# Patient Record
Sex: MALE | Race: WHITE | URBAN - METROPOLITAN AREA
[De-identification: names, ages, dates, MRNs, and addresses within clinical notes are randomized per-mention and may not be internally consistent; named-entity substitution may affect disease eponyms.]

---

## 2018-04-18 ENCOUNTER — OUTPATIENT (OUTPATIENT)
Dept: OUTPATIENT SERVICES | Facility: HOSPITAL | Age: 48
LOS: 1 days | Discharge: HOME | End: 2018-04-18

## 2018-04-18 DIAGNOSIS — Z79.899 OTHER LONG TERM (CURRENT) DRUG THERAPY: ICD-10-CM

## 2018-04-18 DIAGNOSIS — F20.9 SCHIZOPHRENIA, UNSPECIFIED: ICD-10-CM

## 2018-05-09 ENCOUNTER — OUTPATIENT (OUTPATIENT)
Dept: OUTPATIENT SERVICES | Facility: HOSPITAL | Age: 48
LOS: 1 days | Discharge: HOME | End: 2018-05-09

## 2018-05-09 DIAGNOSIS — Z79.899 OTHER LONG TERM (CURRENT) DRUG THERAPY: ICD-10-CM

## 2018-05-09 DIAGNOSIS — F20.9 SCHIZOPHRENIA, UNSPECIFIED: ICD-10-CM

## 2018-05-27 ENCOUNTER — EMERGENCY (EMERGENCY)
Facility: HOSPITAL | Age: 48
LOS: 0 days | Discharge: HOME | End: 2018-05-27
Admitting: EMERGENCY MEDICINE

## 2018-05-27 VITALS
OXYGEN SATURATION: 98 % | DIASTOLIC BLOOD PRESSURE: 76 MMHG | SYSTOLIC BLOOD PRESSURE: 126 MMHG | RESPIRATION RATE: 18 BRPM | HEART RATE: 84 BPM | TEMPERATURE: 98 F

## 2018-05-27 DIAGNOSIS — K02.9 DENTAL CARIES, UNSPECIFIED: ICD-10-CM

## 2018-05-27 DIAGNOSIS — Z79.899 OTHER LONG TERM (CURRENT) DRUG THERAPY: ICD-10-CM

## 2018-05-27 DIAGNOSIS — K08.89 OTHER SPECIFIED DISORDERS OF TEETH AND SUPPORTING STRUCTURES: ICD-10-CM

## 2018-05-27 RX ORDER — IBUPROFEN 200 MG
600 TABLET ORAL ONCE
Qty: 0 | Refills: 0 | Status: COMPLETED | OUTPATIENT
Start: 2018-05-27 | End: 2018-05-27

## 2018-05-27 RX ORDER — AMOXICILLIN 250 MG/5ML
1 SUSPENSION, RECONSTITUTED, ORAL (ML) ORAL
Qty: 21 | Refills: 0 | OUTPATIENT
Start: 2018-05-27 | End: 2018-06-02

## 2018-05-27 RX ORDER — IBUPROFEN 200 MG
1 TABLET ORAL
Qty: 28 | Refills: 0 | OUTPATIENT
Start: 2018-05-27 | End: 2018-06-02

## 2018-05-27 RX ORDER — AMOXICILLIN 250 MG/5ML
500 SUSPENSION, RECONSTITUTED, ORAL (ML) ORAL ONCE
Qty: 0 | Refills: 0 | Status: COMPLETED | OUTPATIENT
Start: 2018-05-27 | End: 2018-05-27

## 2018-05-27 RX ADMIN — Medication 600 MILLIGRAM(S): at 13:19

## 2018-05-27 RX ADMIN — Medication 500 MILLIGRAM(S): at 13:19

## 2018-05-27 NOTE — ED PROVIDER NOTE - OBJECTIVE STATEMENT
49 y/o male presents to the ED c/o "I have left lower dental pain. I am seeing my dentist next week." no fever/ chills/ facial swelling

## 2018-06-01 ENCOUNTER — EMERGENCY (EMERGENCY)
Facility: HOSPITAL | Age: 48
LOS: 0 days | Discharge: HOME | End: 2018-06-01
Admitting: PHYSICIAN ASSISTANT

## 2018-06-01 ENCOUNTER — EMERGENCY (EMERGENCY)
Facility: HOSPITAL | Age: 48
LOS: 0 days | Discharge: HOME | End: 2018-06-01
Admitting: EMERGENCY MEDICINE

## 2018-06-01 VITALS
RESPIRATION RATE: 18 BRPM | SYSTOLIC BLOOD PRESSURE: 128 MMHG | DIASTOLIC BLOOD PRESSURE: 78 MMHG | TEMPERATURE: 98 F | OXYGEN SATURATION: 99 % | HEART RATE: 78 BPM

## 2018-06-01 VITALS
SYSTOLIC BLOOD PRESSURE: 145 MMHG | TEMPERATURE: 98 F | DIASTOLIC BLOOD PRESSURE: 90 MMHG | HEART RATE: 87 BPM | RESPIRATION RATE: 20 BRPM

## 2018-06-01 DIAGNOSIS — Z79.51 LONG TERM (CURRENT) USE OF INHALED STEROIDS: ICD-10-CM

## 2018-06-01 DIAGNOSIS — M79.673 PAIN IN UNSPECIFIED FOOT: ICD-10-CM

## 2018-06-01 DIAGNOSIS — Z79.1 LONG TERM (CURRENT) USE OF NON-STEROIDAL ANTI-INFLAMMATORIES (NSAID): ICD-10-CM

## 2018-06-01 RX ORDER — IBUPROFEN 200 MG
600 TABLET ORAL ONCE
Qty: 0 | Refills: 0 | Status: DISCONTINUED | OUTPATIENT
Start: 2018-06-01 | End: 2018-06-01

## 2018-06-01 NOTE — ED PROVIDER NOTE - OBJECTIVE STATEMENT
pt has foot pain, followed by podiatry, has appt in 2 weeks for f/u but requesting naprosyn  no other complaints, no new issues

## 2018-06-05 ENCOUNTER — OUTPATIENT (OUTPATIENT)
Dept: OUTPATIENT SERVICES | Facility: HOSPITAL | Age: 48
LOS: 1 days | Discharge: HOME | End: 2018-06-05

## 2018-06-05 ENCOUNTER — EMERGENCY (EMERGENCY)
Facility: HOSPITAL | Age: 48
LOS: 0 days | Discharge: HOME | End: 2018-06-05
Attending: EMERGENCY MEDICINE | Admitting: EMERGENCY MEDICINE

## 2018-06-05 VITALS
SYSTOLIC BLOOD PRESSURE: 120 MMHG | HEART RATE: 66 BPM | OXYGEN SATURATION: 99 % | RESPIRATION RATE: 18 BRPM | DIASTOLIC BLOOD PRESSURE: 73 MMHG | TEMPERATURE: 98 F

## 2018-06-05 VITALS
HEART RATE: 72 BPM | TEMPERATURE: 97 F | SYSTOLIC BLOOD PRESSURE: 124 MMHG | OXYGEN SATURATION: 98 % | DIASTOLIC BLOOD PRESSURE: 64 MMHG | RESPIRATION RATE: 16 BRPM

## 2018-06-05 DIAGNOSIS — F20.9 SCHIZOPHRENIA, UNSPECIFIED: ICD-10-CM

## 2018-06-05 DIAGNOSIS — K08.9 DISORDER OF TEETH AND SUPPORTING STRUCTURES, UNSPECIFIED: ICD-10-CM

## 2018-06-05 DIAGNOSIS — K08.89 OTHER SPECIFIED DISORDERS OF TEETH AND SUPPORTING STRUCTURES: ICD-10-CM

## 2018-06-05 DIAGNOSIS — Z79.899 OTHER LONG TERM (CURRENT) DRUG THERAPY: ICD-10-CM

## 2018-06-05 RX ORDER — IBUPROFEN 200 MG
1 TABLET ORAL
Qty: 40 | Refills: 0 | OUTPATIENT
Start: 2018-06-05 | End: 2018-06-14

## 2018-06-05 RX ORDER — AMOXICILLIN 250 MG/5ML
1 SUSPENSION, RECONSTITUTED, ORAL (ML) ORAL
Qty: 14 | Refills: 0 | OUTPATIENT
Start: 2018-06-05 | End: 2018-06-11

## 2018-06-05 RX ORDER — AMOXICILLIN 250 MG/5ML
500 SUSPENSION, RECONSTITUTED, ORAL (ML) ORAL ONCE
Qty: 0 | Refills: 0 | Status: COMPLETED | OUTPATIENT
Start: 2018-06-05 | End: 2018-06-05

## 2018-06-05 RX ORDER — IBUPROFEN 200 MG
600 TABLET ORAL ONCE
Qty: 0 | Refills: 0 | Status: COMPLETED | OUTPATIENT
Start: 2018-06-05 | End: 2018-06-05

## 2018-06-05 RX ADMIN — Medication 600 MILLIGRAM(S): at 02:10

## 2018-06-05 RX ADMIN — Medication 500 MILLIGRAM(S): at 02:08

## 2018-06-05 NOTE — ED PROVIDER NOTE - ATTENDING CONTRIBUTION TO CARE
48y m no pmhx presents for dental pain. diffuse, most at L lower. Patient with no fever or systemic sx. No difficulty breathing or swallowing. Requesting motrin. Has own dentist with whom he follow. Exam: WDWN NAD comfortable appearing, speaking in full sentences. no drooling, no trismus, no stridor. Mouth with diffuse dental decay and gingivitis. No abscess or facial swelling noted. No sublingual induration. Pharynx clear. A/P - dental pain and decay - abx, analgesia. Has own dentist or may follow up in dental clinic. vida.

## 2018-06-05 NOTE — ED PROVIDER NOTE - ENMT NEGATIVE STATEMENT, MLM
Ears: no ear pain and no hearing problems.Nose: no nasal congestion and no nasal drainage.Mouth/Throat:+multiple cracked and missing teeth. multiple cavities. +tooth pain

## 2018-06-05 NOTE — ED PROVIDER NOTE - ENMT, MLM
Airway patent, Nasal mucosa clear. Mouth with normal mucosa. +multiple cracked and missing teeth. multiple cavities. +tooth ttp in L upper back molars

## 2018-06-05 NOTE — ED PROVIDER NOTE - OBJECTIVE STATEMENT
48yM no allergies pmh poor dentition p/w dental pain in L top back molar. patient has appointment with dentist upcoming. Requesting pain control since he can't sleep.

## 2018-06-06 DIAGNOSIS — F20.9 SCHIZOPHRENIA, UNSPECIFIED: ICD-10-CM

## 2018-06-06 DIAGNOSIS — Z79.899 OTHER LONG TERM (CURRENT) DRUG THERAPY: ICD-10-CM

## 2018-06-18 ENCOUNTER — EMERGENCY (EMERGENCY)
Facility: HOSPITAL | Age: 48
LOS: 0 days | Discharge: HOME | End: 2018-06-18
Attending: EMERGENCY MEDICINE | Admitting: EMERGENCY MEDICINE

## 2018-06-18 VITALS
RESPIRATION RATE: 18 BRPM | DIASTOLIC BLOOD PRESSURE: 57 MMHG | TEMPERATURE: 97 F | SYSTOLIC BLOOD PRESSURE: 124 MMHG | HEART RATE: 80 BPM | OXYGEN SATURATION: 96 %

## 2018-06-18 DIAGNOSIS — R09.81 NASAL CONGESTION: ICD-10-CM

## 2018-06-18 DIAGNOSIS — Z79.2 LONG TERM (CURRENT) USE OF ANTIBIOTICS: ICD-10-CM

## 2018-06-18 DIAGNOSIS — Z79.1 LONG TERM (CURRENT) USE OF NON-STEROIDAL ANTI-INFLAMMATORIES (NSAID): ICD-10-CM

## 2018-06-18 DIAGNOSIS — Z79.899 OTHER LONG TERM (CURRENT) DRUG THERAPY: ICD-10-CM

## 2018-06-18 DIAGNOSIS — J34.89 OTHER SPECIFIED DISORDERS OF NOSE AND NASAL SINUSES: ICD-10-CM

## 2018-06-18 RX ORDER — OXYMETAZOLINE HYDROCHLORIDE 0.5 MG/ML
1 SPRAY NASAL ONCE
Qty: 0 | Refills: 0 | Status: COMPLETED | OUTPATIENT
Start: 2018-06-18 | End: 2018-06-18

## 2018-06-18 RX ORDER — DIPHENHYDRAMINE HCL 50 MG
2 CAPSULE ORAL
Qty: 40 | Refills: 0 | OUTPATIENT
Start: 2018-06-18 | End: 2018-06-27

## 2018-06-18 RX ADMIN — OXYMETAZOLINE HYDROCHLORIDE 1 SPRAY(S): 0.5 SPRAY NASAL at 13:14

## 2018-06-18 NOTE — ED PROVIDER NOTE - PROGRESS NOTE DETAILS
ATTENDING NOTE:   49 y/o M presents to ED for b/l clear rhinorrhea and nasal congestion. No fever/chills, ear pain, cough, CP, SOB. No other complaints.   On exam: Pt is non-toxic, WA, NAD. (+)Clear rhinorrhea b/l. MMM, OP clear. TM’s clear b/l. S1S2 regular. Lungs CTAB.   Pt requests RX in order to afford meds. Will send Afrin to pharmacy and discharge home with outpatient follow up.

## 2018-06-18 NOTE — ED PROVIDER NOTE - OBJECTIVE STATEMENT
47 y/o male who presents to ED for nasal congestion x2 weeks. Patient states he usually takes benadryl for symptoms but does not have money and would like to have a  prescription for benadryl so that his insurance will cover the medication. No fever, chills, cough. No ill contacts.

## 2018-06-21 ENCOUNTER — EMERGENCY (EMERGENCY)
Facility: HOSPITAL | Age: 48
LOS: 0 days | Discharge: HOME | End: 2018-06-21
Admitting: PHYSICIAN ASSISTANT

## 2018-06-21 VITALS
TEMPERATURE: 96 F | HEART RATE: 82 BPM | DIASTOLIC BLOOD PRESSURE: 73 MMHG | SYSTOLIC BLOOD PRESSURE: 129 MMHG | RESPIRATION RATE: 20 BRPM | OXYGEN SATURATION: 97 %

## 2018-06-21 DIAGNOSIS — J02.9 ACUTE PHARYNGITIS, UNSPECIFIED: ICD-10-CM

## 2018-06-21 DIAGNOSIS — Z79.899 OTHER LONG TERM (CURRENT) DRUG THERAPY: ICD-10-CM

## 2018-06-21 RX ORDER — AZITHROMYCIN 500 MG/1
1 TABLET, FILM COATED ORAL
Qty: 6 | Refills: 0 | OUTPATIENT
Start: 2018-06-21 | End: 2018-06-25

## 2018-06-21 RX ORDER — IBUPROFEN 200 MG
600 TABLET ORAL ONCE
Qty: 0 | Refills: 0 | Status: COMPLETED | OUTPATIENT
Start: 2018-06-21 | End: 2018-06-21

## 2018-06-21 RX ORDER — IBUPROFEN 200 MG
1 TABLET ORAL
Qty: 12 | Refills: 0 | OUTPATIENT
Start: 2018-06-21 | End: 2018-06-23

## 2018-06-21 RX ADMIN — Medication 600 MILLIGRAM(S): at 08:30

## 2018-06-21 NOTE — ED PROVIDER NOTE - ENMT NEGATIVE STATEMENT, MLM
Ears: no ear pain and no hearing problems.Nose: no nasal congestion and no nasal drainage.Mouth/Throat: +throat pain. Neck: no lumps, no pain, no stiffness and no swollen glands.

## 2018-06-21 NOTE — ED PROVIDER NOTE - ENMT, MLM
+ pharyngeal erythema without tonsillar exudates ; uvula midline ; Airway patent, Nasal mucosa clear.

## 2018-06-21 NOTE — ED ADULT NURSE NOTE - NS ED NURSE DC INFO COMPLEXITY
constipation, diarrhea, nausea and vomiting. Genitourinary: Negative for dysuria, frequency and hematuria. Musculoskeletal: Negative for back pain and myalgias. Neurological: Negative for light-headedness and headaches. Except as noted above the remainder of the review of systems was reviewed and negative. PHYSICAL EXAM    (up to 7 for level 4, 8 or more for level 5)     ED Triage Vitals [03/27/18 1734]   BP Temp Temp Source Heart Rate Resp SpO2 Height Weight - Scale   -- 100 °F (37.8 °C) Oral 138 16 96 % -- 46 lb 11.2 oz (21.2 kg)       Physical Exam   Constitutional: He is active. HENT:   Right Ear: Tympanic membrane normal.   Left Ear: Tympanic membrane normal.   Nose: No nasal discharge. Mouth/Throat: Mucous membranes are moist.   Posterior oropharynx is slightly erythematous without exudates or edema. Uvula remains midline   Eyes: Conjunctivae are normal.   Neck: Neck supple. No neck adenopathy. Cardiovascular: Normal rate and regular rhythm. Pulmonary/Chest: Breath sounds normal. No respiratory distress. Abdominal: Soft. He exhibits no distension. There is no tenderness. Neurological: He is alert. Skin: Skin is warm and dry. No rash noted. Cheeks are flushed         DIAGNOSTIC RESULTS     EKG: All EKG's are interpreted by the Emergency Department Physician who either signs or Co-signs this chart in the absence of a cardiologist.      RADIOLOGY:   Non-plain film images such as CT, Ultrasound and MRI are read by the radiologist. Plain radiographic images are visualized and preliminarily interpreted by the emergency physician with the below findings:    Interpretation per the Radiologist below, if available at the time of this note:    XR CHEST STANDARD (2 VW)   Final Result   1. No radiographic evidence for acute cardiopulmonary disease process. 2. Nonspecific air-filled prominent loops of bowel in the visualized upper   abdomen.   Could consider dedicated abdominal imaging for further evaluation,   as indicated. LABS:  Labs Reviewed   STREP SCREEN GROUP A THROAT   RAPID INFLUENZA A/B ANTIGENS   STREP A DNA PROBE, AMPLIFICATION       All other labs were within normal range or not returned as of this dictation. EMERGENCY DEPARTMENT COURSE and DIFFERENTIAL DIAGNOSIS/MDM:   Vitals:    Vitals:    18 1734   Pulse: 138   Resp: 16   Temp: 100 °F (37.8 °C)   TempSrc: Oral   SpO2: 96%   Weight: 46 lb 11.2 oz (21.2 kg)       Medical Decision Makinyear-old male who is febrile and appears ill but not toxic. Rapid strep is negative and culture is pending. Chest x-ray does not have any evidence of pneumonia. Mother refused a flu swab. He was medicated with Tylenol and Motrin. He will be treated for bronchitis and will receive his first dose of azithromycin prior to discharge. FINAL IMPRESSION      1. Bronchitis          DISPOSITION/PLAN   DISPOSITION Decision To Discharge 2018 06:33:33 PM      PATIENT REFERRED TO:   Chase Vazquez, [address] [place] [address]  [phone]    In 3 days      [facility] ED  [address] [facility]  [phone]    If symptoms worsen      DISCHARGE MEDICATIONS:     New Prescriptions    AZITHROMYCIN (ZITHROMAX) 100 MG/5ML SUSPENSION    Take 5 mLs by mouth daily for 4 days       (Please note that portions of this note were completed with a voice recognition program.  Efforts were made to edit the dictations but occasionally words are mis-transcribed. )    KAITY MCMANUS, [address] [place] [address], [facility]  [id number] Simple: Patient demonstrates quick and easy understanding

## 2018-06-21 NOTE — ED PROVIDER NOTE - OBJECTIVE STATEMENT
47 y/o M, no significant PMHx, presents to the ED with complaints of a sore-throat x two days. He denies associated fever, chills, nausea, vomiting, chest pain, dyspnea, recent travel and recent known sick contacts. He is a smoker.

## 2018-06-30 ENCOUNTER — EMERGENCY (EMERGENCY)
Facility: HOSPITAL | Age: 48
LOS: 0 days | Discharge: HOME | End: 2018-06-30
Admitting: PHYSICIAN ASSISTANT

## 2018-06-30 VITALS
DIASTOLIC BLOOD PRESSURE: 62 MMHG | TEMPERATURE: 99 F | SYSTOLIC BLOOD PRESSURE: 126 MMHG | HEART RATE: 92 BPM | RESPIRATION RATE: 18 BRPM | OXYGEN SATURATION: 96 %

## 2018-06-30 DIAGNOSIS — Z79.2 LONG TERM (CURRENT) USE OF ANTIBIOTICS: ICD-10-CM

## 2018-06-30 DIAGNOSIS — F17.200 NICOTINE DEPENDENCE, UNSPECIFIED, UNCOMPLICATED: ICD-10-CM

## 2018-06-30 DIAGNOSIS — Z79.899 OTHER LONG TERM (CURRENT) DRUG THERAPY: ICD-10-CM

## 2018-06-30 DIAGNOSIS — Z79.1 LONG TERM (CURRENT) USE OF NON-STEROIDAL ANTI-INFLAMMATORIES (NSAID): ICD-10-CM

## 2018-06-30 DIAGNOSIS — J02.9 ACUTE PHARYNGITIS, UNSPECIFIED: ICD-10-CM

## 2018-06-30 RX ORDER — DEXAMETHASONE 0.5 MG/5ML
10 ELIXIR ORAL ONCE
Qty: 0 | Refills: 0 | Status: COMPLETED | OUTPATIENT
Start: 2018-06-30 | End: 2018-06-30

## 2018-06-30 RX ADMIN — Medication 10 MILLIGRAM(S): at 15:19

## 2018-07-05 ENCOUNTER — OUTPATIENT (OUTPATIENT)
Dept: OUTPATIENT SERVICES | Facility: HOSPITAL | Age: 48
LOS: 1 days | Discharge: HOME | End: 2018-07-05

## 2018-07-05 DIAGNOSIS — F20.9 SCHIZOPHRENIA, UNSPECIFIED: ICD-10-CM

## 2018-07-05 DIAGNOSIS — Z79.899 OTHER LONG TERM (CURRENT) DRUG THERAPY: ICD-10-CM

## 2018-07-08 ENCOUNTER — EMERGENCY (EMERGENCY)
Facility: HOSPITAL | Age: 48
LOS: 0 days | Discharge: HOME | End: 2018-07-08

## 2018-07-11 ENCOUNTER — EMERGENCY (EMERGENCY)
Facility: HOSPITAL | Age: 48
LOS: 0 days | Discharge: HOME | End: 2018-07-11
Admitting: PHYSICIAN ASSISTANT

## 2018-07-11 VITALS
SYSTOLIC BLOOD PRESSURE: 119 MMHG | TEMPERATURE: 97 F | DIASTOLIC BLOOD PRESSURE: 56 MMHG | HEART RATE: 76 BPM | RESPIRATION RATE: 18 BRPM | OXYGEN SATURATION: 96 %

## 2018-07-11 VITALS — WEIGHT: 210.1 LBS

## 2018-07-11 DIAGNOSIS — F17.200 NICOTINE DEPENDENCE, UNSPECIFIED, UNCOMPLICATED: ICD-10-CM

## 2018-07-11 DIAGNOSIS — J02.9 ACUTE PHARYNGITIS, UNSPECIFIED: ICD-10-CM

## 2018-07-11 DIAGNOSIS — Z79.82 LONG TERM (CURRENT) USE OF ASPIRIN: ICD-10-CM

## 2018-07-11 DIAGNOSIS — Z79.2 LONG TERM (CURRENT) USE OF ANTIBIOTICS: ICD-10-CM

## 2018-07-11 DIAGNOSIS — Z79.899 OTHER LONG TERM (CURRENT) DRUG THERAPY: ICD-10-CM

## 2018-07-11 DIAGNOSIS — J06.9 ACUTE UPPER RESPIRATORY INFECTION, UNSPECIFIED: ICD-10-CM

## 2018-07-11 DIAGNOSIS — Z79.811 LONG TERM (CURRENT) USE OF AROMATASE INHIBITORS: ICD-10-CM

## 2018-07-11 DIAGNOSIS — Z02.9 ENCOUNTER FOR ADMINISTRATIVE EXAMINATIONS, UNSPECIFIED: ICD-10-CM

## 2018-07-11 DIAGNOSIS — Z79.1 LONG TERM (CURRENT) USE OF NON-STEROIDAL ANTI-INFLAMMATORIES (NSAID): ICD-10-CM

## 2018-07-11 RX ORDER — DIPHENHYDRAMINE HCL 50 MG
1 CAPSULE ORAL
Qty: 9 | Refills: 0 | OUTPATIENT
Start: 2018-07-11 | End: 2018-07-13

## 2018-07-11 NOTE — ED PROVIDER NOTE - OBJECTIVE STATEMENT
1 week of sore throat, nasal congestion and cough. No fever, SOB, drooling. Seen here 2 days ago for same and given Decadron shot. Pt is requesting benadryl and antiseptic mouth wash

## 2018-07-11 NOTE — ED PROVIDER NOTE - NS ED ROS FT
CONST: No fever, chills or bodyaches  EYES: No pain, redness, drainage or visual changes.  CARD: No chest pain, palpitations  GI: No abdominal pain, N/V/D  MS: No joint pain, back pain or extremity pain/injury  SKIN: No rashes  NEURO: No headache, dizziness, paresthesias or LOC

## 2018-07-17 ENCOUNTER — EMERGENCY (EMERGENCY)
Facility: HOSPITAL | Age: 48
LOS: 0 days | Discharge: HOME | End: 2018-07-17
Admitting: PHYSICIAN ASSISTANT

## 2018-07-17 VITALS
OXYGEN SATURATION: 97 % | DIASTOLIC BLOOD PRESSURE: 61 MMHG | HEART RATE: 74 BPM | SYSTOLIC BLOOD PRESSURE: 113 MMHG | RESPIRATION RATE: 18 BRPM | TEMPERATURE: 96 F

## 2018-07-17 VITALS — WEIGHT: 210.1 LBS | HEIGHT: 69 IN

## 2018-07-17 DIAGNOSIS — J02.9 ACUTE PHARYNGITIS, UNSPECIFIED: ICD-10-CM

## 2018-07-17 DIAGNOSIS — Z79.1 LONG TERM (CURRENT) USE OF NON-STEROIDAL ANTI-INFLAMMATORIES (NSAID): ICD-10-CM

## 2018-07-17 DIAGNOSIS — Z79.899 OTHER LONG TERM (CURRENT) DRUG THERAPY: ICD-10-CM

## 2018-07-17 DIAGNOSIS — Z79.2 LONG TERM (CURRENT) USE OF ANTIBIOTICS: ICD-10-CM

## 2018-07-17 DIAGNOSIS — Z79.84 LONG TERM (CURRENT) USE OF ORAL HYPOGLYCEMIC DRUGS: ICD-10-CM

## 2018-07-17 DIAGNOSIS — F17.210 NICOTINE DEPENDENCE, CIGARETTES, UNCOMPLICATED: ICD-10-CM

## 2018-07-17 DIAGNOSIS — Z79.811 LONG TERM (CURRENT) USE OF AROMATASE INHIBITORS: ICD-10-CM

## 2018-07-17 RX ORDER — DEXAMETHASONE 0.5 MG/5ML
10 ELIXIR ORAL ONCE
Qty: 0 | Refills: 0 | Status: COMPLETED | OUTPATIENT
Start: 2018-07-17 | End: 2018-07-17

## 2018-07-17 RX ORDER — AMOXICILLIN 250 MG/5ML
1 SUSPENSION, RECONSTITUTED, ORAL (ML) ORAL
Qty: 20 | Refills: 0 | OUTPATIENT
Start: 2018-07-17 | End: 2018-07-26

## 2018-07-17 RX ADMIN — Medication 10 MILLIGRAM(S): at 11:28

## 2018-07-31 ENCOUNTER — EMERGENCY (EMERGENCY)
Facility: HOSPITAL | Age: 48
LOS: 0 days | Discharge: HOME | End: 2018-07-31
Attending: EMERGENCY MEDICINE | Admitting: EMERGENCY MEDICINE

## 2018-07-31 VITALS
HEART RATE: 77 BPM | OXYGEN SATURATION: 95 % | SYSTOLIC BLOOD PRESSURE: 145 MMHG | TEMPERATURE: 97 F | RESPIRATION RATE: 20 BRPM | DIASTOLIC BLOOD PRESSURE: 76 MMHG

## 2018-07-31 VITALS
TEMPERATURE: 97 F | DIASTOLIC BLOOD PRESSURE: 77 MMHG | HEART RATE: 75 BPM | SYSTOLIC BLOOD PRESSURE: 142 MMHG | OXYGEN SATURATION: 95 % | RESPIRATION RATE: 19 BRPM

## 2018-07-31 DIAGNOSIS — F91.8 OTHER CONDUCT DISORDERS: ICD-10-CM

## 2018-07-31 DIAGNOSIS — R25.1 TREMOR, UNSPECIFIED: ICD-10-CM

## 2018-07-31 DIAGNOSIS — Z79.899 OTHER LONG TERM (CURRENT) DRUG THERAPY: ICD-10-CM

## 2018-07-31 DIAGNOSIS — F20.9 SCHIZOPHRENIA, UNSPECIFIED: ICD-10-CM

## 2018-07-31 DIAGNOSIS — F31.9 BIPOLAR DISORDER, UNSPECIFIED: ICD-10-CM

## 2018-07-31 DIAGNOSIS — F17.200 NICOTINE DEPENDENCE, UNSPECIFIED, UNCOMPLICATED: ICD-10-CM

## 2018-07-31 DIAGNOSIS — Z79.2 LONG TERM (CURRENT) USE OF ANTIBIOTICS: ICD-10-CM

## 2018-07-31 NOTE — ED BEHAVIORAL HEALTH ASSESSMENT NOTE - OTHER PAST PSYCHIATRIC HISTORY (INCLUDE DETAILS REGARDING ONSET, COURSE OF ILLNESS, INPATIENT/OUTPATIENT TREATMENT)
pt reports prior admission to [facility] at [place], he reports no prior suicide attempts, sees psychiatrist  Dr. Sterling at [facility]

## 2018-07-31 NOTE — ED PROVIDER NOTE - MEDICAL DECISION MAKING DETAILS
psych clearence. dispo per psych Pt sent for eval of aggression per patient. Psych consulted - seen and cleared. No acute risk to self or others. Feels and appears well. No complaints at present. Eager to leave. Stable for discharge.

## 2018-07-31 NOTE — ED BEHAVIORAL HEALTH ASSESSMENT NOTE - DESCRIPTION
pt sitting in ED chair, NAD, w/ 1:1 at chairside diabetes pt is single, never employed, on SSI, has lived at [facility] for 3-4 months

## 2018-07-31 NOTE — ED ADULT NURSE NOTE - OBJECTIVE STATEMENT
Pt states he does not know why he's here, he missed one dose of depakote this AM, wants it and then wants to leave. Pt slightly anxious and agitated, eager to leave ASAP. Pt is undressed and placed in gown with continuous 1:1 constant observation until psych eval.

## 2018-07-31 NOTE — ED BEHAVIORAL HEALTH ASSESSMENT NOTE - CURRENT MEDICATION
fluphenazine 2.5mg PO Qdaily, fluphenazine decanoate 2.5mg IM [id number], valproic acid 250mg PO Qdaily, valproic acid 500mg PO BID, valproic acid 750mg QHS, benztropine 1mg Qdaily, benztropine 2mg PO QHS, metformin 500mg PO BID, benadryl 25mg PO QHS, ativan 0.5mg PO QHS, meds confirmed with [facility] Chart:     fluphenazine 2.5mg PO Qdaily, fluphenazine decanoate 2.5mg IM [id number], valproic acid 250mg PO Qdaily, valproic acid 500mg PO BID, valproic acid 750mg QHS, benztropine 1mg Qdaily, benztropine 2mg PO QHS, metformin 500mg PO BID, benadryl 25mg PO QHS, ativan 0.5mg PO QHS,

## 2018-07-31 NOTE — ED PROVIDER NOTE - OBJECTIVE STATEMENT
47 yo M hx of paranoid schizophrenia sent from group home for ?aggression. Per patient he missed a Depakote dose this AM. Was given monthly allowance by staff and felt they withheld money. Denies SI/HI. Feels well. Requests missed depakote dose. Denies etoh/illicit drugs

## 2018-07-31 NOTE — ED PROVIDER NOTE - PHYSICAL EXAMINATION
AAOX3, NAD, NCAT, PERRL, MMM, Neck Supple, RRR, CTABL, ABD S/NT/ND Radial pulses 2+ b/l.. No Rash,  MAEx4, Sensation to soft touch grossly intact, normal strength/tone. intermittent axial tremor. Exxagerated affect. good eye contact. does not appear internally preoccupied, denies si/hi.

## 2018-07-31 NOTE — ED PROVIDER NOTE - NS ED ROS FT
No fever/chills, no change in vision, no throat pain, no chest pain, no sob, no abdominal pain, no nausea/vomiting,  no dysuria, no joint pain, no rashes, no focal numbness or weakness, +schizophrenia, no latex allergy.

## 2018-07-31 NOTE — ED ADULT NURSE NOTE - ADDITIONAL PRINTED INSTRUCTIONS GIVEN
Called [address] at [place] spoke with Tato, pt allowed to walk back no transportation services needed.

## 2018-07-31 NOTE — ED ADULT TRIAGE NOTE - CHIEF COMPLAINT QUOTE
Pt sent in from [facility] for paranoid schizophrenia. Pt very agitated in triage, denies suicidal/homicidal ideation. Pt placed on 1:1 observation.

## 2018-07-31 NOTE — ED BEHAVIORAL HEALTH ASSESSMENT NOTE - SUMMARY
This is a 47yo single,  male, domiciled in [facility], on SSI, with history of bipolar disorder, no prior IPP admissions who was sent in from [place] for being "paranoid, agitated, and threatening to others". Psychiatry was consulted for mental health evaluation.    Upon assessment, patient is not depressed, suicidal, homicidal, manic, or psychotic at this time. His episode of agitation and verbal threats at [facility] this morning were in context of not being paid his full monthly monetary allowance. Patient is logical with fair judgement and insight and is compliant with his psychiatric medication regimen. He is not an imminent danger to himself or others. He does not require inpatient psychiatric hospitalization. He would benefit from following up with his outpatient psychiatrist, Dr. Sterling at [facility].

## 2018-07-31 NOTE — ED BEHAVIORAL HEALTH ASSESSMENT NOTE - RISK ASSESSMENT
moderate risk; Although patient has history of bipolar disorder, he has no history of suicide attempts, he is future oriented (wants to go back to [place]), has strong family support (his mother), is fearful of pain/suffering, and has access and willingness to receive care.

## 2018-07-31 NOTE — ED BEHAVIORAL HEALTH ASSESSMENT NOTE - HPI (INCLUDE ILLNESS QUALITY, SEVERITY, DURATION, TIMING, CONTEXT, MODIFYING FACTORS, ASSOCIATED SIGNS AND SYMPTOMS)
This is a 47yo single,  male, domiciled in [facility], on SSI, with history of bipolar disorder, no prior IPP admissions who was sent in from [place] for being "paranoid, agitated, and threatening to others". Psychiatry was consulted for mental health evaluation.    Upon approach, patient was calm and cooperative. He states that he feels "fine" and "doesn't This is a 47yo single,  male, domiciled in [facility], on SSI, with history of bipolar disorder, no prior IPP admissions who was sent in from [place] for being "paranoid, agitated, and threatening to others". Psychiatry was consulted for mental health evaluation.    Upon approach, patient was calm and cooperative. He states that he is "doing great". He states that he had an argument with staff this morning because he did not get his full monthly allowance. He states that he is supposed to get 166$ per month and that yesterday he received 50$, today he received $55 and he was told that he had no remainder allowance left for the month. Patient reports that he is still owed 61$ and was told that he was not and that he "already signed for his full allowance". He states he got angry because he feels like he is being "cheated". He states that other than the incident this morning, he is feeling "fine", is sleeping "well", taking his medications "religiously", and denies any acute complaints. He denies any thoughts of wanting to hurt himself or others. He denies hearing any voices that others cannot hear. He denies feeling paranoid that others are out to get him. He denies any symptoms suggestive of geronimo. He denies any past suicide attempts. He denies any current substance use.    collateral obtained from [facility]- [phone]- LPN- She states that patient got "agitated" this morning in context of being owed money. She states he told staff he was going to get "mafia involved to finish them". She states that patient did not physically hurt anyone but got very "loud" and "threatening". She states he is compliant with all of his medications. She states he may be abusing extra benadryl not prescribed to him.

## 2018-07-31 NOTE — ED BEHAVIORAL HEALTH ASSESSMENT NOTE - SAFETY PLAN DETAILS
Pt was explained that if he has any thoughts of wanting to hurt himself or others, he should seek medical attention immediately.

## 2018-07-31 NOTE — ED ADULT NURSE NOTE - CHIEF COMPLAINT QUOTE
Pt sent in from [facility] for paranoid schizophrenia. Pt very agitated in triage, denies suicidal/homicidal ideation. Pt placed on 1:1 observation.

## 2018-07-31 NOTE — ED BEHAVIORAL HEALTH ASSESSMENT NOTE - CASE SUMMARY
48 year old man with a past diagnosis of bipolar disorder, chronic on decanoate and PO antipsychotic medication along with depakote, reportedly adherent who was sent to the ED for agitation at [facility].  The patient presents with insight into what occurred at the [facility] which was a verbal altercation when he believed he was cheated out of money.  he does not display signs of mood or psychotic exacerbation.  He is calm and in behavioral control and is able to safety plan in regards to returning to the residence.

## 2018-08-29 ENCOUNTER — EMERGENCY (EMERGENCY)
Facility: HOSPITAL | Age: 48
LOS: 0 days | Discharge: HOME | End: 2018-08-29
Attending: EMERGENCY MEDICINE | Admitting: STUDENT IN AN ORGANIZED HEALTH CARE EDUCATION/TRAINING PROGRAM

## 2018-08-29 VITALS
HEART RATE: 119 BPM | DIASTOLIC BLOOD PRESSURE: 53 MMHG | TEMPERATURE: 98 F | RESPIRATION RATE: 20 BRPM | OXYGEN SATURATION: 98 % | SYSTOLIC BLOOD PRESSURE: 128 MMHG

## 2018-08-29 VITALS
TEMPERATURE: 96 F | SYSTOLIC BLOOD PRESSURE: 132 MMHG | RESPIRATION RATE: 20 BRPM | DIASTOLIC BLOOD PRESSURE: 70 MMHG | HEART RATE: 71 BPM

## 2018-08-29 DIAGNOSIS — Z79.899 OTHER LONG TERM (CURRENT) DRUG THERAPY: ICD-10-CM

## 2018-08-29 DIAGNOSIS — F25.0 SCHIZOAFFECTIVE DISORDER, BIPOLAR TYPE: ICD-10-CM

## 2018-08-29 DIAGNOSIS — R45.1 RESTLESSNESS AND AGITATION: ICD-10-CM

## 2018-08-29 DIAGNOSIS — F19.90 OTHER PSYCHOACTIVE SUBSTANCE USE, UNSPECIFIED, UNCOMPLICATED: ICD-10-CM

## 2018-08-29 DIAGNOSIS — F17.200 NICOTINE DEPENDENCE, UNSPECIFIED, UNCOMPLICATED: ICD-10-CM

## 2018-08-29 LAB
ALBUMIN SERPL ELPH-MCNC: 4.6 G/DL — SIGNIFICANT CHANGE UP (ref 3.5–5.2)
ALP SERPL-CCNC: 52 U/L — SIGNIFICANT CHANGE UP (ref 30–115)
ALT FLD-CCNC: 19 U/L — SIGNIFICANT CHANGE UP (ref 0–41)
ANION GAP SERPL CALC-SCNC: 16 MMOL/L — HIGH (ref 7–14)
APAP SERPL-MCNC: <5 UG/ML — LOW (ref 10–30)
AST SERPL-CCNC: 23 U/L — SIGNIFICANT CHANGE UP (ref 0–41)
BASOPHILS # BLD AUTO: 0.02 K/UL — SIGNIFICANT CHANGE UP (ref 0–0.2)
BASOPHILS NFR BLD AUTO: 0.2 % — SIGNIFICANT CHANGE UP (ref 0–1)
BILIRUB SERPL-MCNC: 0.4 MG/DL — SIGNIFICANT CHANGE UP (ref 0.2–1.2)
BUN SERPL-MCNC: 17 MG/DL — SIGNIFICANT CHANGE UP (ref 10–20)
CALCIUM SERPL-MCNC: 9.5 MG/DL — SIGNIFICANT CHANGE UP (ref 8.5–10.1)
CHLORIDE SERPL-SCNC: 103 MMOL/L — SIGNIFICANT CHANGE UP (ref 98–110)
CO2 SERPL-SCNC: 21 MMOL/L — SIGNIFICANT CHANGE UP (ref 17–32)
CREAT SERPL-MCNC: 1 MG/DL — SIGNIFICANT CHANGE UP (ref 0.7–1.5)
EOSINOPHIL # BLD AUTO: 0.04 K/UL — SIGNIFICANT CHANGE UP (ref 0–0.7)
EOSINOPHIL NFR BLD AUTO: 0.4 % — SIGNIFICANT CHANGE UP (ref 0–8)
ETHANOL SERPL-MCNC: <10 MG/DL — HIGH
GLUCOSE SERPL-MCNC: 95 MG/DL — SIGNIFICANT CHANGE UP (ref 70–99)
HCT VFR BLD CALC: 38.6 % — LOW (ref 42–52)
HGB BLD-MCNC: 13.1 G/DL — LOW (ref 14–18)
IMM GRANULOCYTES NFR BLD AUTO: 0.3 % — SIGNIFICANT CHANGE UP (ref 0.1–0.3)
LYMPHOCYTES # BLD AUTO: 2.07 K/UL — SIGNIFICANT CHANGE UP (ref 1.2–3.4)
LYMPHOCYTES # BLD AUTO: 21.1 % — SIGNIFICANT CHANGE UP (ref 20.5–51.1)
MCHC RBC-ENTMCNC: 30.8 PG — SIGNIFICANT CHANGE UP (ref 27–31)
MCHC RBC-ENTMCNC: 33.9 G/DL — SIGNIFICANT CHANGE UP (ref 32–37)
MCV RBC AUTO: 90.6 FL — SIGNIFICANT CHANGE UP (ref 80–94)
MONOCYTES # BLD AUTO: 0.69 K/UL — HIGH (ref 0.1–0.6)
MONOCYTES NFR BLD AUTO: 7 % — SIGNIFICANT CHANGE UP (ref 1.7–9.3)
NEUTROPHILS # BLD AUTO: 6.96 K/UL — HIGH (ref 1.4–6.5)
NEUTROPHILS NFR BLD AUTO: 71 % — SIGNIFICANT CHANGE UP (ref 42.2–75.2)
NRBC # BLD: 0 /100 WBCS — SIGNIFICANT CHANGE UP (ref 0–0)
PLATELET # BLD AUTO: 269 K/UL — SIGNIFICANT CHANGE UP (ref 130–400)
POTASSIUM SERPL-MCNC: 4.7 MMOL/L — SIGNIFICANT CHANGE UP (ref 3.5–5)
POTASSIUM SERPL-SCNC: 4.7 MMOL/L — SIGNIFICANT CHANGE UP (ref 3.5–5)
PROT SERPL-MCNC: 7 G/DL — SIGNIFICANT CHANGE UP (ref 6–8)
RBC # BLD: 4.26 M/UL — LOW (ref 4.7–6.1)
RBC # FLD: 12.9 % — SIGNIFICANT CHANGE UP (ref 11.5–14.5)
SALICYLATES SERPL-MCNC: <0.3 MG/DL — LOW (ref 4–30)
SODIUM SERPL-SCNC: 140 MMOL/L — SIGNIFICANT CHANGE UP (ref 135–146)
VALPROATE SERPL-MCNC: 39 UG/ML — LOW (ref 50–100)
WBC # BLD: 9.81 K/UL — SIGNIFICANT CHANGE UP (ref 4.8–10.8)
WBC # FLD AUTO: 9.81 K/UL — SIGNIFICANT CHANGE UP (ref 4.8–10.8)

## 2018-08-29 RX ORDER — FLUPHENAZINE HYDROCHLORIDE 1 MG/1
25 TABLET, FILM COATED ORAL ONCE
Qty: 0 | Refills: 0 | Status: COMPLETED | OUTPATIENT
Start: 2018-08-29 | End: 2018-08-29

## 2018-08-29 RX ORDER — HALOPERIDOL DECANOATE 100 MG/ML
5 INJECTION INTRAMUSCULAR ONCE
Qty: 0 | Refills: 0 | Status: COMPLETED | OUTPATIENT
Start: 2018-08-29 | End: 2018-08-29

## 2018-08-29 RX ORDER — DIPHENHYDRAMINE HCL 50 MG
25 CAPSULE ORAL ONCE
Qty: 0 | Refills: 0 | Status: COMPLETED | OUTPATIENT
Start: 2018-08-29 | End: 2018-08-29

## 2018-08-29 RX ADMIN — Medication 2 MILLIGRAM(S): at 11:14

## 2018-08-29 RX ADMIN — Medication 25 MILLIGRAM(S): at 12:08

## 2018-08-29 RX ADMIN — FLUPHENAZINE HYDROCHLORIDE 25 MILLIGRAM(S): 1 TABLET, FILM COATED ORAL at 15:57

## 2018-08-29 RX ADMIN — HALOPERIDOL DECANOATE 5 MILLIGRAM(S): 100 INJECTION INTRAMUSCULAR at 12:08

## 2018-08-29 NOTE — ED BEHAVIORAL HEALTH ASSESSMENT NOTE - OTHER PAST PSYCHIATRIC HISTORY (INCLUDE DETAILS REGARDING ONSET, COURSE OF ILLNESS, INPATIENT/OUTPATIENT TREATMENT)
Patient reports that he does not know if he has ever been admitted in the psychiatric hospital in the past but denies past suicide attempts

## 2018-08-29 NOTE — ED BEHAVIORAL HEALTH ASSESSMENT NOTE - RISK ASSESSMENT
At this time, patient is not considered an imminent danger to himself or others and does not need inpatient  psychiatric hospitalization. Patient was offered a dose of Prolixin decanoate 25mg I.m to day and he was agreeable to take the medication. Patient was also agreeable to follow up with his outpatient psychiatrist Dr Sterling upon discharge.

## 2018-08-29 NOTE — ED BEHAVIORAL HEALTH ASSESSMENT NOTE - DESCRIPTION (FIRST USE, LAST USE, QUANTITY, FREQUENCY, DURATION)
Patient is minimally forthcoming with information about his alcohol use, he reports that his last use was 28 years ago Patient is minimally forthcoming with information about his marijuana  use patient is minimally forthcoming with information about his cocaine use, he reports that his last use was 2 years ago

## 2018-08-29 NOTE — ED BEHAVIORAL HEALTH ASSESSMENT NOTE - REFERRAL / APPOINTMENT DETAILS
Recommend follow up with outpatient psychiatrist Dr Orr as soon as possible. Staff of [facility] informed

## 2018-08-29 NOTE — ED BEHAVIORAL HEALTH ASSESSMENT NOTE - DESCRIPTION
Patient is somewhat paranoid but is in good behavioral control As per [facility] staff, patient has diabetes Patient reports that he grew up in [place], has 9 siblings, got his GED. he reports that he never , has 2 children but doesn't know where they are

## 2018-08-29 NOTE — ED ADULT NURSE NOTE - OBJECTIVE STATEMENT
Patient agitated having flight of ideas and being  paranoid states he didn't get his Depikane & cogentin this AM states they are trying to poison him Patient agitated having flight of ideas and being  paranoid states he didn't get his Depikane & cogentin this AM states they are trying to poison him.  Thinks we are stealing his credit cards  keeps wipping down the chair he is in. Patient agitated having flight of ideas and being  paranoid states he didn't get his Depikane & cogentin this AM states they are trying to poison him.  Thinks we are stealing his credit cards  keeps wipping down the chair he is in. As per EMS patient was seen inhaling benadryl capsules

## 2018-08-29 NOTE — ED ADULT NURSE REASSESSMENT NOTE - NS ED NURSE REASSESS COMMENT FT1
Patient has 1:1 sitter at bedside
Psychiatry in room to see patient still unable to draw labs
patient refusing blood work at this time awaiting patient to be more calm before attempting labs MD aware Patient has 1:1 at bedside Security is near by for safety
Patient asleep in the chair no distress noted 1:1 at bedside
patient seen by psychiatry patient more cooperative.  remains paranoid 1:1 at bedside.  Labs drawn and sent to lab

## 2018-08-29 NOTE — ED BEHAVIORAL HEALTH ASSESSMENT NOTE - CURRENT MEDICATION
Cogentin 1mg p.o 9Am, 2mg  Q 9PM, metformin 500mg  p.O 9AM and 5Pm, benadryl 25mg P.O 9Pm, depakote 500mg P.o 9AM, 250mg Q 9PM, Ativan 0.5mg P.o 9PM, Prolixin 2.5mg P.o 9PM, Omega 3 Fish oil 2000mg p.o 9PM, Prolixin Decanoate 25mg I.m last dose 8/15/2018

## 2018-08-29 NOTE — ED ADULT NURSE NOTE - SUICIDE RISK FACTORS
None Known/Agitation/severe anxiety/Highly impulsive behavior Other/Agitation/severe anxiety/None Known/Highly impulsive behavior

## 2018-08-29 NOTE — ED BEHAVIORAL HEALTH ASSESSMENT NOTE - SAFETY PLAN DETAILS
Patient should return to the ED or call 911 if he feels increasingly suicidal or feels that he is a danger to himself or others

## 2018-08-29 NOTE — ED PROVIDER NOTE - OBJECTIVE STATEMENT
49 y/o male with hx Bipolar BIBA from [facility] for evaluation of agitation and aggressive behavior. Per staff patient is paranoid threatening patients and staff. Patient denies drug use/ suicidal ideation/ homicidal ideation 49 y/o male with hx Schizoaffective Bipolar BIBA from [facility] for evaluation of agitation and aggressive behavior. Per staff patient is paranoid threatening patients and staff. Patient denies drug use/ suicidal ideation/ homicidal ideation

## 2018-08-29 NOTE — ED PROVIDER NOTE - ATTENDING CONTRIBUTION TO CARE
47 yo m hx bipolar vs paranoid schizoprenia BIBA from [facility] for aggressive behavior. per staff there, he was threatening patients and staff. no si/hi. questionable report of drug abuse which pt denies.     vss  exam-  paranoid, tangential thought  no obvious toxidrome  hent- pupils 4mm reactive  card-rrr  lungs-ctab  abd-sntnd  neuro- nonfocal, normal coordination    will send psych/tox clearance labs, ekg, prn benzo/haldol for agitation  psych consult

## 2018-08-29 NOTE — ED ADULT NURSE NOTE - NS_BH TRG QUESTION8_ED_ALL_ED
Behavioral Problems (includes ADHD, Oppositionality)/Anxiety (includes Panic, OCD)/Oly (includes Bipolar Disorder)

## 2018-08-29 NOTE — ED BEHAVIORAL HEALTH ASSESSMENT NOTE - SUMMARY
Mr Benitez is a 48 year old man with a history of Schizoaffective disorder , bipolar type and Substance use disorder who presented to the ED for a mental health evaluation. It seems that patient's agitation yester is out of character for him and is likely secondary to intoxication from the unclear substances he sniffed or smoked. Since being in the ER patient has been observed to be  some what paranoid but is in good behavioral control.   On review of the medication records sent by [facility], patient appears to be compliant with his oral medication and  collateral information indicates that patient's symptoms are chronic and not secondary to an acute decompensation of hi9s psychiatric illness.

## 2018-08-29 NOTE — ED ADULT NURSE NOTE - NS ED PATIENT SAFETY CONERN FT
Patient is a client of [facility] patient for increased agitation and paranoid behavior send for further evaluation

## 2018-08-29 NOTE — ED BEHAVIORAL HEALTH ASSESSMENT NOTE - HPI (INCLUDE ILLNESS QUALITY, SEVERITY, DURATION, TIMING, CONTEXT, MODIFYING FACTORS, ASSOCIATED SIGNS AND SYMPTOMS)
Mr Benitez is a 48 year old  man, resides in [address], single, has 2 children, unemployed , with a history of Schizoaffective disorder , bipolar type and Substance use disorder who presented to the ED for a mental health evaluation.  Patient reports that     Collateral information was obtained from nurse Brittanie at [facility] ( [phone]). she reports that she was not at work when the incident occurred . however on report, it was said that patient became agitated , did not sleep at night , was banging on the walls in his room and was threatening towards staff and other clients. She reports that patient was said to have been seen sniffing unknown powder earlier on today . She reports that patient has not been sleeping well for the past weeks however has otherwise been in good behavioral control. She reports that patient follows upt5 with psychiatrist Dr Sterling and takes his oral medication when he wants to however received his Prolixin decanoate 25mg I.M on 8/15 and is usually complaint with this medication every 2 weeks . Staff was informed that patient would be offered the long acting medication in the Er since it is due tomorrow and would be encouraged to follow up with his out patient psychiatrist as soon as possible. Staff reports understanding and is comfortable taking patient back to the residence. Mr Benitez is a 48 year old  man, resides in [address], single, has 2 children, unemployed , with a history of Schizoaffective disorder , bipolar type and Substance use disorder who presented to the ED for a mental health evaluation. According to the ED team, patient was said to have sniffed grounded up benadryl and Tylenol.   Patient reports that he smoked a cigarette that made him feels strange. he states " it made me crazy, it may have been laced with something, I don't know". he reports that he however feels better and wants to go back home because he has already missed an important appointment.   patient denies any acute symptoms of depression, geronimo or psychosis. patient denies current use of illicit drugs or alcohol. he reports that his last drink was 28 days ago.  He denies suicidal; thoughts , intent or plan.   Collateral information was obtained from Brittanie nurse at [facility] ( [phone]). she reports that she was not at work when the incident occurred . however on report, it was said that patient became agitated , did not sleep at night , was banging on the walls in his room and was threatening towards staff and other clients. She reports that patient was said to have been seen sniffing unknown powder earlier on today . She reports that patient has not been sleeping well for the past weeks however has otherwise been in good behavioral control. She reports that patient follows upt5 with psychiatrist Dr Sterling and takes his oral medication when he wants to however received his Prolixin decanoate 25mg I.M on 8/15 and is usually complaint with this medication every 2 weeks . Staff was informed that patient would be offered the long acting medication in the Er since it is due tomorrow and would be encouraged to follow up with his out patient psychiatrist as soon as possible. Staff reports understanding and is comfortable taking patient back to the residence. Mr Benitez is a 48 year old  man, resides in [address], single, has 2 children, unemployed , with a history of Schizoaffective disorder , bipolar type and Substance use disorder who presented to the ED for a mental health evaluation. According to the ED team, patient was said to have sniffed grounded up benadryl and Tylenol.   Patient reports that he smoked a cigarette that made him feels strange. he states " it made me crazy, it may have been laced with something, I don't know". he reports that he however feels better and wants to go back home because he has already missed an important appointment.   patient denies any acute symptoms of depression, geronimo or psychosis. patient denies current use of illicit drugs or alcohol. he reports that his last drink was 28 days ago.  He denies suicidal; thoughts , intent or plan.   Collateral information was obtained from Brittanie nurse at [facility] ( [phone]). she reports that she was not at work when the incident occurred . however on report, it was said that patient became agitated , did not sleep at night , was banging on the walls in his room and was threatening towards staff and other clients. She reports that patient was said to have been seen sniffing unknown powder earlier on today . She reports that patient has not been sleeping well for the past weeks however has otherwise been in good behavioral control. She reports that patient follows upt5 with psychiatrist Dr Sterling and takes his oral medication when he wants to however received his Prolixin decanoate 25mg I.M on 8/15 and is usually complaint with this medication every 2 weeks . Staff was informed that patient would be offered the long acting medication in the Er since it is due tomorrow and would be encouraged to follow up with his out patient psychiatrist as soon as possible. Staff reports understanding and is comfortable taking patient back to the residence.    [facility]

## 2018-08-29 NOTE — ED PROVIDER NOTE - PROGRESS NOTE DETAILS
pt resistant to workup. paranoid, pacing and menacing to others. verbal deescalation failed. oral benzo failed. pt required calming medication to prevent harm to self/others/workup Patient evaluated by Dr. Nunez recommends giving dose Prolixin and checking labs. Cleared to go back to [place].

## 2018-12-29 ENCOUNTER — EMERGENCY (EMERGENCY)
Facility: HOSPITAL | Age: 48
LOS: 0 days | Discharge: HOME | End: 2018-12-29
Attending: EMERGENCY MEDICINE | Admitting: EMERGENCY MEDICINE

## 2018-12-29 VITALS
RESPIRATION RATE: 18 BRPM | OXYGEN SATURATION: 99 % | SYSTOLIC BLOOD PRESSURE: 139 MMHG | DIASTOLIC BLOOD PRESSURE: 86 MMHG | HEART RATE: 95 BPM

## 2018-12-29 DIAGNOSIS — R45.1 RESTLESSNESS AND AGITATION: ICD-10-CM

## 2018-12-29 DIAGNOSIS — Z79.899 OTHER LONG TERM (CURRENT) DRUG THERAPY: ICD-10-CM

## 2018-12-29 DIAGNOSIS — Z79.2 LONG TERM (CURRENT) USE OF ANTIBIOTICS: ICD-10-CM

## 2018-12-29 NOTE — ED PROVIDER NOTE - PHYSICAL EXAMINATION
CONSTITUTIONAL: Well-developed; well-nourished; in no acute distress. Sitting up and providing appropriate history and physical examination  TRAUMA: Primary and Secondary surveys intact, GCS 15, no midline CTLS spine tenderness, Pelvis stable, + moving all extremities  SKIN: skin exam is warm and dry, no acute rash.  HEAD: Normocephalic; atraumatic.  EYES: PERRL, 3 mm bilateral, no nystagmus, EOM intact; conjunctiva and sclera clear.  ENT: No nasal discharge; airway clear.  NECK: Supple; non tender. + full passive ROM in all directions. No JVD  CARD: S1, S2 normal; no murmurs, gallops, or rubs. Regular rate and rhythm. + Symmetric Strong Pulses  RESP: No wheezes, rales or rhonchi. Good air movement bilaterally  ABD: soft; non-distended; non-tender. No Rebound, No Guarding, No signs of peritonitis, No CVA tenderness. No pulsatile abdominal mass. + Strong and Symmetric Pulses  EXT: Normal ROM. No clubbing, cyanosis or edema. Dp and Pt Pulses intact. Cap refill less than 3 seconds  NEURO: CN 2-12 intact, normal finger to nose, normal romberg, stable gait, no sensory or motor deficits, Alert, oriented, grossly unremarkable. No Focal deficits. GCS 15. NIH 0  PSYCH: Cooperative, appropriate. No sir o hi, no a/v hallucinations
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
## 2018-12-29 NOTE — ED ADULT NURSE REASSESSMENT NOTE - NS ED NURSE REASSESS COMMENT FT1
pt is being agitated wants to leave, MD made aware. pt continue to be under 1:1 observation. Safety maintained. Will continue to monitor
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
## 2018-12-29 NOTE — ED ADULT NURSE NOTE - NSIMPLEMENTINTERV_GEN_ALL_ED
Implemented All Universal Safety Interventions:  [place] to call system. Call bell, personal items and telephone within reach. Instruct patient to call for assistance. Room bathroom lighting operational. Non-slip footwear when patient is off stretcher. Physically safe environment: no spills, clutter or unnecessary equipment. Stretcher in lowest position, wheels locked, appropriate side rails in place.
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
## 2018-12-29 NOTE — ED BEHAVIORAL HEALTH ASSESSMENT NOTE - DESCRIPTION
Patient is somewhat paranoid but is in good behavioral control. As per [facility] staff, patient has diabetes Patient reports that he grew up in [place], has 9 siblings, got his GED. he reports that he never , has 2 children but doesn't know where they are

## 2018-12-30 PROBLEM — F31.9 BIPOLAR DISORDER, UNSPECIFIED: Chronic | Status: ACTIVE | Noted: 2018-08-29

## 2019-02-18 ENCOUNTER — EMERGENCY (EMERGENCY)
Facility: HOSPITAL | Age: 49
LOS: 0 days | Discharge: HOME | End: 2019-02-18
Admitting: PHYSICIAN ASSISTANT

## 2019-02-18 VITALS
DIASTOLIC BLOOD PRESSURE: 76 MMHG | TEMPERATURE: 97 F | SYSTOLIC BLOOD PRESSURE: 105 MMHG | HEART RATE: 72 BPM | OXYGEN SATURATION: 98 % | RESPIRATION RATE: 20 BRPM

## 2019-02-18 DIAGNOSIS — H11.31 CONJUNCTIVAL HEMORRHAGE, RIGHT EYE: ICD-10-CM

## 2019-02-18 DIAGNOSIS — Z79.2 LONG TERM (CURRENT) USE OF ANTIBIOTICS: ICD-10-CM

## 2019-02-18 DIAGNOSIS — F17.200 NICOTINE DEPENDENCE, UNSPECIFIED, UNCOMPLICATED: ICD-10-CM

## 2019-02-18 DIAGNOSIS — Z79.899 OTHER LONG TERM (CURRENT) DRUG THERAPY: ICD-10-CM

## 2019-02-18 DIAGNOSIS — H57.89 OTHER SPECIFIED DISORDERS OF EYE AND ADNEXA: ICD-10-CM

## 2019-02-18 NOTE — ED PROVIDER NOTE - NS ED ROS FT
Review of Systems    Constitutional: (-) fever  Eyes/ENT: (-) blurry vision  Cardiovascular: (-) chest pain, (-) syncope  Respiratory: (-) cough, (-) shortness of breath  Gastrointestinal: (-) vomiting  Musculoskeletal: (-) neck pain, (-) back pain  Integumentary: (-) rash, (-) edema  Neurological: (-) headache

## 2019-02-18 NOTE — ED PROVIDER NOTE - NSFOLLOWUPINSTRUCTIONS_ED_ALL_ED_FT
Subconjunctival Hemorrhage  Subconjunctival hemorrhage is bleeding that happens between the white part of your eye (sclera) and the clear membrane that covers the outside of your eye (conjunctiva). There are many tiny blood vessels near the surface of your eye. A subconjunctival hemorrhage happens when one or more of these vessels breaks and bleeds, causing a red patch to appear on your eye. This is similar to a bruise.    ImageDepending on the amount of bleeding, the red patch may only cover a small area of your eye or it may cover the entire visible part of the sclera. If a lot of blood collects under the conjunctiva, there may also be swelling. Subconjunctival hemorrhages do not affect your vision or cause pain, but your eye may feel irritated if there is swelling. Subconjunctival hemorrhages usually do not require treatment, and they disappear on their own within two weeks.    What are the causes?  This condition may be caused by:    Mild trauma, such as rubbing your eye too hard.  Severe trauma or blunt injuries.  Coughing, sneezing, or vomiting.  Straining, such as when lifting a heavy object.  High blood pressure.  Recent eye surgery.  A history of diabetes.  Certain medicines, especially blood thinners (anticoagulants).  Other conditions, such as eye tumors, bleeding disorders, or blood vessel abnormalities.    Subconjunctival hemorrhages can happen without an obvious cause.    What are the signs or symptoms?  Symptoms of this condition include:    A bright red or dark red patch on the white part of the eye.    The red area may spread out to cover a larger area of the eye before it goes away.  The red area may turn brownish-yellow before it goes away.    Swelling.  Mild eye irritation.    How is this diagnosed?  This condition is diagnosed with a physical exam. If your subconjunctival hemorrhage was caused by trauma, your health care provider may refer you to an eye specialist (ophthalmologist) or another specialist to check for other injuries. You may have other tests, including:    An eye exam.  A blood pressure check.  Blood tests to check for bleeding disorders.    If your subconjunctival hemorrhage was caused by trauma, X-rays or a CT scan may be done to check for other injuries.    How is this treated?  Usually, no treatment is needed. Your health care provider may recommend eye drops or cold compresses to help with discomfort.    Follow these instructions at home:  Take over-the-counter and prescription medicines only as directed by your health care provider.  Use eye drops or cold compresses to help with discomfort as directed by your health care provider.  Avoid activities, things, and environments that may irritate or injure your eye.  Keep all follow-up visits as told by your health care provider. This is important.  Contact a health care provider if:  You have pain in your eye.  The bleeding does not go away within 3 weeks.  You keep getting new subconjunctival hemorrhages.  Get help right away if:  Your vision changes or you have difficulty seeing.  You suddenly develop severe sensitivity to light.  You develop a severe headache, persistent vomiting, confusion, or abnormal tiredness (lethargy).  Your eye seems to bulge or protrude from your eye socket.  You develop unexplained bruises on your body.  You have unexplained bleeding in another area of your body.  This information is not intended to replace advice given to you by your health care provider. Make sure you discuss any questions you have with your health care provider.

## 2019-02-18 NOTE — ED PROVIDER NOTE - OBJECTIVE STATEMENT
47 y/o M with psychiatric but no medical hx presents with R medial eye redness x today. no pain, swelling, discharge, crusting, vision changes. Denies CP, SOB, abdominal pain, n/v/d, fevers, sweats, chills, HA, trauma, rash, blood thinner use, recent ocular surgeries, bruising, hx coagulopathies.
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
## 2019-02-18 NOTE — ED PROVIDER NOTE - NSFOLLOWUPCLINICS_GEN_ALL_ED_FT
A Family Medicine Doctor  Family Medicine  .  [place]   Phone:   Fax:   Follow Up Time: 1-3 Days    [facility]  Ophthalmolgy  [address], [address]  [place] [address]  Phone: [phone]  Fax:   Follow Up Time: 1-3 Days

## 2019-02-18 NOTE — ED PROVIDER NOTE - CLINICAL SUMMARY MEDICAL DECISION MAKING FREE TEXT BOX
+subconjunctival hemorrhage. no red flags. Counseled on red flags and to return for them.  Patient appears well on discharge.

## 2019-02-18 NOTE — ED PROVIDER NOTE - PHYSICAL EXAMINATION
PHYSICAL EXAM:    GENERAL: Alert, appears stated age, well appearing, non-toxic  SKIN: Warm, pink and dry. MMM.   EYE: Normal lids, PERRL, EOMI. OU20/15 OD20/15 OS 20/20 uncorrected. fluorescein stain reveals no corneal abrasions. +medial subconjunctival injection c/w subconjunctival hemorrhage.   ENT: Normal hearing, patent oropharynx without erythema or exudate  NECK: +supple. No meningismus  Pulm: Bilateral BS, normal resp effort, no wheezes, stridor, or retractions  CV: RRR, no M/R/G, 2+and = radial pulses  Abd: soft, non-tender, non-distended  Mskel: no erythema, cyanosis, edema. no calf tenderness  Neuro: AAOx3,  strength throughout. normal gait.

## 2019-04-03 ENCOUNTER — OUTPATIENT (OUTPATIENT)
Dept: OUTPATIENT SERVICES | Facility: HOSPITAL | Age: 49
LOS: 1 days | Discharge: HOME | End: 2019-04-03

## 2019-04-03 DIAGNOSIS — Z79.899 OTHER LONG TERM (CURRENT) DRUG THERAPY: ICD-10-CM

## 2019-04-03 DIAGNOSIS — F20.9 SCHIZOPHRENIA, UNSPECIFIED: ICD-10-CM

## 2019-06-05 ENCOUNTER — OUTPATIENT (OUTPATIENT)
Dept: OUTPATIENT SERVICES | Facility: HOSPITAL | Age: 49
LOS: 1 days | Discharge: HOME | End: 2019-06-05

## 2019-06-05 DIAGNOSIS — Z79.899 OTHER LONG TERM (CURRENT) DRUG THERAPY: ICD-10-CM

## 2019-06-05 DIAGNOSIS — F20.9 SCHIZOPHRENIA, UNSPECIFIED: ICD-10-CM

## 2019-06-14 ENCOUNTER — EMERGENCY (EMERGENCY)
Facility: HOSPITAL | Age: 49
LOS: 0 days | Discharge: HOME | End: 2019-06-14
Admitting: EMERGENCY MEDICINE
Payer: MEDICAID

## 2019-06-14 VITALS
RESPIRATION RATE: 18 BRPM | SYSTOLIC BLOOD PRESSURE: 127 MMHG | OXYGEN SATURATION: 98 % | HEART RATE: 78 BPM | TEMPERATURE: 97 F | HEIGHT: 68 IN | WEIGHT: 205.03 LBS | DIASTOLIC BLOOD PRESSURE: 68 MMHG

## 2019-06-14 DIAGNOSIS — K08.89 OTHER SPECIFIED DISORDERS OF TEETH AND SUPPORTING STRUCTURES: ICD-10-CM

## 2019-06-14 PROCEDURE — 99283 EMERGENCY DEPT VISIT LOW MDM: CPT | Mod: 25

## 2019-06-14 RX ORDER — AMOXICILLIN 250 MG/5ML
1 SUSPENSION, RECONSTITUTED, ORAL (ML) ORAL
Qty: 20 | Refills: 0
Start: 2019-06-14 | End: 2019-06-23

## 2019-06-14 RX ORDER — IBUPROFEN 200 MG
1 TABLET ORAL
Qty: 21 | Refills: 0
Start: 2019-06-14 | End: 2019-06-20

## 2019-06-14 RX ORDER — IBUPROFEN 200 MG
600 TABLET ORAL ONCE
Refills: 0 | Status: COMPLETED | OUTPATIENT
Start: 2019-06-14 | End: 2019-06-14

## 2019-06-14 RX ADMIN — Medication 600 MILLIGRAM(S): at 01:48

## 2019-06-14 RX ADMIN — Medication 1 TABLET(S): at 01:47

## 2019-06-14 NOTE — ED PROVIDER NOTE - PHYSICAL EXAMINATION
CONSTITUTIONAL: Well-appearing; well-nourished; in no apparent distress.   ENT: poor dentition; normal nose; no rhinorrhea; normal pharynx with no tonsillar hypertrophy.   NECK: Supple; non-tender; no cervical lymphadenopathy. No JVD.   SKIN: Normal for age and race; warm; dry; good turgor; no apparent lesions or exudate.   NEURO/PSYCH: A & O x 4; grossly unremarkable. mood and manner are appropriate. Grooming and personal hygiene are appropriate. No apparent thoughts of harm to self or others.

## 2019-06-14 NOTE — ED PROVIDER NOTE - OBJECTIVE STATEMENT
pt c/o front tooth pain for a day  pain is sharp, nonradiating, moderate  denies exacerbating or relieving factors  Denies fever/chill/HA/dizziness/chest pain/palpitation/sob/abd pain/n/v/d/ black stool/bloody stool/urinary sxs

## 2019-06-14 NOTE — ED PROVIDER NOTE - NSFOLLOWUPINSTRUCTIONS_ED_ALL_ED_FT
Dental Pain    Dental pain (toothache) may be caused by many things including tooth decay (cavities or caries), abscess or infection, injury, or the reason may be unknown. Your pain may only occur when you are chewing, are exposed to hot or cold temperature, are eating or drinking sugary foods or beverages, or your pain may be constant. If you were prescribed an antibiotic medicine, finish all of it even if you start to feel better. Rinsing your mouth with salt water or applying ice to the painful area of your face may help with the pain.    SEEK IMMEDIATE MEDICAL CARE IF YOU HAVE THE FOLLOWING SYMPTOMS: unable to open mouth, trouble breathing or swallowing, fever, or swelling of the face, neck or jaw.

## 2019-06-14 NOTE — ED PROVIDER NOTE - PROGRESS NOTE DETAILS
Discussed possible side effects of abx. including but not limited to cdiff/resistance/GI upset. if intolerance, dc use and return to office . Also if there is no improvement, return for re-evaluation. advised to take probiotics.

## 2019-06-14 NOTE — ED PROVIDER NOTE - NSFOLLOWUPCLINICS_GEN_ALL_ED_FT
[facility]  Dental  [address] [place] [address]  Phone: [phone]  Fax:   Follow Up Time:

## 2019-06-14 NOTE — ED ADULT NURSE NOTE - NSIMPLEMENTINTERV_GEN_ALL_ED
Implemented All Universal Safety Interventions:  [place] to call system. Call bell, personal items and telephone within reach. Instruct patient to call for assistance. Room bathroom lighting operational. Non-slip footwear when patient is off stretcher. Physically safe environment: no spills, clutter or unnecessary equipment. Stretcher in lowest position, wheels locked, appropriate side rails in place.

## 2019-06-23 ENCOUNTER — EMERGENCY (EMERGENCY)
Facility: HOSPITAL | Age: 49
LOS: 0 days | Discharge: LEFT BEFORE DISCHARGE | End: 2019-06-23

## 2019-06-24 ENCOUNTER — EMERGENCY (EMERGENCY)
Facility: HOSPITAL | Age: 49
LOS: 0 days | Discharge: HOME | End: 2019-06-24
Attending: EMERGENCY MEDICINE | Admitting: EMERGENCY MEDICINE
Payer: MEDICAID

## 2019-06-24 VITALS
WEIGHT: 205.03 LBS | OXYGEN SATURATION: 99 % | SYSTOLIC BLOOD PRESSURE: 125 MMHG | RESPIRATION RATE: 20 BRPM | HEART RATE: 79 BPM | TEMPERATURE: 98 F | DIASTOLIC BLOOD PRESSURE: 55 MMHG

## 2019-06-24 DIAGNOSIS — Z79.899 OTHER LONG TERM (CURRENT) DRUG THERAPY: ICD-10-CM

## 2019-06-24 DIAGNOSIS — K02.9 DENTAL CARIES, UNSPECIFIED: ICD-10-CM

## 2019-06-24 DIAGNOSIS — F17.200 NICOTINE DEPENDENCE, UNSPECIFIED, UNCOMPLICATED: ICD-10-CM

## 2019-06-24 DIAGNOSIS — K08.89 OTHER SPECIFIED DISORDERS OF TEETH AND SUPPORTING STRUCTURES: ICD-10-CM

## 2019-06-24 PROCEDURE — 99283 EMERGENCY DEPT VISIT LOW MDM: CPT | Mod: 25

## 2019-06-24 RX ORDER — IBUPROFEN 200 MG
1 TABLET ORAL
Qty: 30 | Refills: 0
Start: 2019-06-24

## 2019-06-24 RX ORDER — AMOXICILLIN 250 MG/5ML
500 SUSPENSION, RECONSTITUTED, ORAL (ML) ORAL ONCE
Refills: 0 | Status: COMPLETED | OUTPATIENT
Start: 2019-06-24 | End: 2019-06-24

## 2019-06-24 RX ORDER — AMOXICILLIN 250 MG/5ML
1 SUSPENSION, RECONSTITUTED, ORAL (ML) ORAL
Qty: 21 | Refills: 0
Start: 2019-06-24 | End: 2019-06-30

## 2019-06-24 RX ORDER — IBUPROFEN 200 MG
600 TABLET ORAL ONCE
Refills: 0 | Status: COMPLETED | OUTPATIENT
Start: 2019-06-24 | End: 2019-06-24

## 2019-06-24 RX ADMIN — Medication 600 MILLIGRAM(S): at 05:08

## 2019-06-24 RX ADMIN — Medication 500 MILLIGRAM(S): at 05:09

## 2019-06-24 NOTE — ED ADULT TRIAGE NOTE - CHIEF COMPLAINT QUOTE
Pt  here for new script for amoxicillin and ibuprofen for toothache/ was  few days ago but needs a new drug store

## 2019-06-24 NOTE — ED PROVIDER NOTE - PHYSICAL EXAMINATION
CONSTITUTIONAL: Well-appearing; well-nourished; in no apparent distress.   ENT: poor dental care. + decay of all upper teeth and lower molar. + decay tooth and ttp # 21 with gum swelling  NECK: Supple; non-tender; no cervical lymphadenopathy.   CARDIOVASCULAR: Normal S1, S2; no murmurs, rubs, or gallops.   RESPIRATORY: Normal chest excursion with respiration; breath sounds clear and equal bilaterally; no wheezes, rhonchi, or rales.  SKIN: Normal for age and race; warm; dry; good turgor; no apparent lesions or exudate.

## 2019-06-24 NOTE — ED ADULT TRIAGE NOTE - WEIGHT IN LBS
Procedure Note  Patient Name: Mila Lowry  Patient MRN: [id number]  Date of Procedure: 05/01/2018  Pre Procedure Dx: bilateral Doroteo 3 gynecomastia  Post Procedure Dx: same  Procedure:   1. Suction lipectomy of the chest - bilateral  2. Mastectomy for gynecomastia - bilateral  Surgeon:  Rob Arrington MD FACS FAAP  EBL: min  Disposition at conclusion of procedure:Extubated, stable condition, to PACU    700mL of fat aspirated in total   90g of breast tissue from the right  80g of breast tissue from the left    Full note to follow  
205

## 2019-06-24 NOTE — ED PROVIDER NOTE - CLINICAL SUMMARY MEDICAL DECISION MAKING FREE TEXT BOX
Will refill Amoxicillin and Ibuprofen and patient to follow up in dental clinic.  No fever or signs of infectious etiology.  Patient will require extensive dental work for amount of decay.     Full DC instructions discussed and patient knows when to seek immediate medical attention.  Patient has proper follow up.  All results discussed and patient aware they may require further work up.  Proper follow up ensured. Limitations of ED work up discussed.  Medications administered and prescribed/OTC home meds discussed.  All questions and concerns from patient or family addressed. Understanding of instructions verbalized.

## 2019-06-24 NOTE — ED PROVIDER NOTE - ATTENDING CONTRIBUTION TO CARE
I personally evaluated patient. I agree with the findings and plan with all documentation on chart except as documented  in my note.    Will refill Amoxicillin and Ibuprofen and patient to follow up in dental clinic.  No fever or signs of infectious etiology.  Patient will require extensive dental work for amount of decay.     Full DC instructions discussed and patient knows when to seek immediate medical attention.  Patient has proper follow up.  All results discussed and patient aware they may require further work up.  Proper follow up ensured. Limitations of ED work up discussed.  Medications administered and prescribed/OTC home meds discussed.  All questions and concerns from patient or family addressed. Understanding of instructions verbalized.

## 2019-06-24 NOTE — ED PROVIDER NOTE - NS ED ROS FT
Constitutional: no fever, chills, no recent weight loss, change in appetite or malaise  ENT: + toothache no rhinorrhea/ear pain/sore throat  Cardiac: No chest pain, SOB or edema.  Respiratory: No cough or respiratory distress  Skin: No skin rash.

## 2019-06-24 NOTE — ED PROVIDER NOTE - OBJECTIVE STATEMENT
48 yo male hx of bipolar present c/o left lower toothache x 3 days. Reports similar pain in the past but worsen again over the past 3 days. denies fever/chill/HA/ear pain/sore throat/chest pain/neck pain/sob/difficulty swallowing and breathing.

## 2019-06-24 NOTE — ED ADULT NURSE NOTE - CAS ELECT INFOMATION PROVIDED
DC instructions/pt instructed to follow up with dentist in 1-2 days or return to ed for new or worsening symptoms, instructed to take medication as prescribed

## 2019-06-24 NOTE — ED PROVIDER NOTE - NSFOLLOWUPCLINICS_GEN_ALL_ED_FT
[facility]  Dental  [address] [place] [address]  Phone: [phone]  Fax:   Follow Up Time:

## 2019-07-22 ENCOUNTER — EMERGENCY (EMERGENCY)
Facility: HOSPITAL | Age: 49
LOS: 0 days | Discharge: HOME | End: 2019-07-22
Attending: EMERGENCY MEDICINE | Admitting: EMERGENCY MEDICINE
Payer: MEDICAID

## 2019-07-22 VITALS
SYSTOLIC BLOOD PRESSURE: 128 MMHG | TEMPERATURE: 98 F | OXYGEN SATURATION: 99 % | DIASTOLIC BLOOD PRESSURE: 85 MMHG | HEART RATE: 67 BPM | RESPIRATION RATE: 18 BRPM

## 2019-07-22 DIAGNOSIS — F43.9 REACTION TO SEVERE STRESS, UNSPECIFIED: ICD-10-CM

## 2019-07-22 PROCEDURE — 99283 EMERGENCY DEPT VISIT LOW MDM: CPT
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
## 2019-07-22 NOTE — ED PROVIDER NOTE - CLINICAL SUMMARY MEDICAL DECISION MAKING FREE TEXT BOX
48 yo male  with poor coping skill after learning he was about to get less money than expected,  No SI/HI, calm and cooperative in ED, d/c back to [facility].

## 2019-07-22 NOTE — ED ADULT NURSE REASSESSMENT NOTE - NS ED NURSE REASSESS COMMENT FT1
pt a&ox4, steady gait exhibited, pt aware that transport was coming to take pt back to [facility]. pt stated he did not want to wait, pt left. MD Finney aware.

## 2019-07-22 NOTE — ED PROVIDER NOTE - NSFOLLOWUPCLINICS_GEN_ALL_ED_FT
[facility]  Medicine  [address] [place], [place]   Phone: [phone]  Fax:   Follow Up Time: 1-3 Days
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
## 2019-07-22 NOTE — ED ADULT NURSE NOTE - OBJECTIVE STATEMENT
pt sent in from [facility] for agitation, pt states he was supposed to get money today from facility but did not receive money and got upset. pt a&ox4, steady gait exhibited. pt not agitated at this time, calm demeanor. denies CP/SOB. denies suicidal or homicidal ideations.

## 2019-07-24 NOTE — ED ADULT NURSE NOTE - NS ED NOTE ABUSE RESPONSE YN
Subjective:       Patient ID: Hussein Davis Yes

## 2019-08-03 ENCOUNTER — EMERGENCY (EMERGENCY)
Facility: HOSPITAL | Age: 49
LOS: 1 days | Discharge: HOME | End: 2019-08-03

## 2019-08-03 VITALS
HEART RATE: 65 BPM | RESPIRATION RATE: 18 BRPM | HEIGHT: 69 IN | OXYGEN SATURATION: 99 % | TEMPERATURE: 98 F | WEIGHT: 207.9 LBS | SYSTOLIC BLOOD PRESSURE: 119 MMHG | DIASTOLIC BLOOD PRESSURE: 60 MMHG

## 2019-08-03 DIAGNOSIS — K08.89 OTHER SPECIFIED DISORDERS OF TEETH AND SUPPORTING STRUCTURES: ICD-10-CM

## 2019-08-03 NOTE — ED ADULT NURSE NOTE - NS_CALLED_NO_RESPONSE_ED_ALL_ED
Pt called multiple times, RN and MD went to room pt was assigned multiple times. Pt was not there./Patient called and no answer

## 2019-08-18 ENCOUNTER — EMERGENCY (EMERGENCY)
Facility: HOSPITAL | Age: 49
LOS: 0 days | Discharge: HOME | End: 2019-08-18
Attending: EMERGENCY MEDICINE | Admitting: EMERGENCY MEDICINE
Payer: MEDICAID

## 2019-08-18 VITALS
RESPIRATION RATE: 18 BRPM | OXYGEN SATURATION: 98 % | WEIGHT: 207.9 LBS | HEIGHT: 69 IN | DIASTOLIC BLOOD PRESSURE: 58 MMHG | SYSTOLIC BLOOD PRESSURE: 126 MMHG | HEART RATE: 67 BPM | TEMPERATURE: 96 F

## 2019-08-18 DIAGNOSIS — F17.200 NICOTINE DEPENDENCE, UNSPECIFIED, UNCOMPLICATED: ICD-10-CM

## 2019-08-18 DIAGNOSIS — K04.7 PERIAPICAL ABSCESS WITHOUT SINUS: ICD-10-CM

## 2019-08-18 DIAGNOSIS — K08.89 OTHER SPECIFIED DISORDERS OF TEETH AND SUPPORTING STRUCTURES: ICD-10-CM

## 2019-08-18 PROCEDURE — 99283 EMERGENCY DEPT VISIT LOW MDM: CPT

## 2019-08-18 PROCEDURE — 99053 MED SERV 10PM-8AM 24 HR FAC: CPT

## 2019-08-18 RX ORDER — IBUPROFEN 200 MG
600 TABLET ORAL ONCE
Refills: 0 | Status: COMPLETED | OUTPATIENT
Start: 2019-08-18 | End: 2019-08-18

## 2019-08-18 RX ORDER — IBUPROFEN 200 MG
1 TABLET ORAL
Qty: 28 | Refills: 0
Start: 2019-08-18 | End: 2019-08-24

## 2019-08-18 RX ORDER — ACETAMINOPHEN 500 MG
650 TABLET ORAL ONCE
Refills: 0 | Status: COMPLETED | OUTPATIENT
Start: 2019-08-18 | End: 2019-08-18

## 2019-08-18 RX ADMIN — Medication 600 MILLIGRAM(S): at 03:30

## 2019-08-18 RX ADMIN — Medication 650 MILLIGRAM(S): at 03:29

## 2019-08-18 NOTE — ED ADULT NURSE NOTE - PAIN RATING/NUMBER SCALE (0-10): ACTIVITY
Patient is requesting a DMV form for a handicap sticker. Patient wants to p/u at the [facility] office and can be reached at [phone] or [phone]. [phone]

## 2019-08-18 NOTE — ED PROVIDER NOTE - NSFOLLOWUPCLINICS_GEN_ALL_ED_FT
A Dentist  Dentistry  .  [place]   Phone:   Fax:   Follow Up Time: 1-3 Days    [facility]  Dental  [address] [place] [address]  Phone: [phone]  Fax:   Follow Up Time: 1-3 Days

## 2019-08-18 NOTE — ED PROVIDER NOTE - PHYSICAL EXAMINATION
CONSTITUTIONAL: WA / WN / NAD  HEAD: NCAT  EYES: PERRL; EOMI;   ENT: poor dention. Decay of multiple teeth + TTP #12 no abscess seen.  NECK: Supple;   MSK/EXT: No gross deformities; full range of motion.  SKIN: Warm and dry;   NEURO: AAOx3

## 2019-08-18 NOTE — ED PROVIDER NOTE - ATTENDING CONTRIBUTION TO CARE
49 y/ male smoker with hx of poor dentition, recently on Abx/NSAIDs for suspected dental abscess presents to ED for recurrence of symptoms.  No HA, neck pain or fever.  No dysphagia/odynophagia.  PE:  agree with above.  A/P:  Dental Caries, NSAIDs and f/u with dental.

## 2019-08-18 NOTE — ED PROVIDER NOTE - CLINICAL SUMMARY MEDICAL DECISION MAKING FREE TEXT BOX
Patient recently completed course of Abx.  Needs extraction.  No signs of abscess requiring drainage.  D/C home.  F/U with Dentist.

## 2019-08-18 NOTE — ED PROVIDER NOTE - OBJECTIVE STATEMENT
49 year old male w/ a pmh of bipolar presents here c/o tooth pain. Patient states he was seen 3 weeks ago given ibuprofen and amoxicillin. Symptoms improved but reoccurred. Patient has been unable to follow up with the dentist due to fear but understands he has to follow up. [place] and will follow up when he comes back. Patient denies any fever chills or difficulty swallowing.

## 2019-08-27 ENCOUNTER — EMERGENCY (EMERGENCY)
Facility: HOSPITAL | Age: 49
LOS: 0 days | Discharge: PSYCHIATRIC OR SUBSTANCE USE FACILITY | End: 2019-08-27
Attending: EMERGENCY MEDICINE | Admitting: EMERGENCY MEDICINE
Payer: MEDICAID

## 2019-08-27 VITALS
DIASTOLIC BLOOD PRESSURE: 75 MMHG | RESPIRATION RATE: 17 BRPM | TEMPERATURE: 97 F | OXYGEN SATURATION: 100 % | HEART RATE: 58 BPM | SYSTOLIC BLOOD PRESSURE: 132 MMHG

## 2019-08-27 DIAGNOSIS — F17.200 NICOTINE DEPENDENCE, UNSPECIFIED, UNCOMPLICATED: ICD-10-CM

## 2019-08-27 DIAGNOSIS — R45.1 RESTLESSNESS AND AGITATION: ICD-10-CM

## 2019-08-27 PROCEDURE — 99282 EMERGENCY DEPT VISIT SF MDM: CPT

## 2019-08-27 NOTE — ED PROVIDER NOTE - PHYSICAL EXAMINATION
Physical Exam    Vital Signs: I have reviewed the initial vital signs.  Constitutional: well-nourished, appears stated age, no acute distress  Cardiovascular: regular rate, regular rhythm, well-perfused extremities  Respiratory: unlabored respiratory effort, clear to auscultation bilaterally  Gastrointestinal: soft, non-tender abdomen, no pulsatile mass  Integumentary: warm, dry, no rash  Neurologic: awake, alert, no gross of focal deficits, ambulating and extremities’ motor and sensory functions grossly intact  Psychiatric: A&Ox3, appropriate mood, appropriate affect
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
## 2019-08-27 NOTE — ED PROVIDER NOTE - PATIENT PORTAL LINK FT
You can access the FollowMyHealth Patient Portal offered by [facility] by registering at the following website: [email or web address]. By joining [[employer]’s FollowMyHealth portal, you will also be able to view your health information using other applications (apps) compatible with our system. OBSERVATION
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
## 2019-08-29 ENCOUNTER — EMERGENCY (EMERGENCY)
Facility: HOSPITAL | Age: 49
LOS: 0 days | Discharge: HOME | End: 2019-08-30
Admitting: EMERGENCY MEDICINE
Payer: MEDICAID

## 2019-08-29 VITALS
WEIGHT: 190.04 LBS | RESPIRATION RATE: 16 BRPM | SYSTOLIC BLOOD PRESSURE: 127 MMHG | TEMPERATURE: 97 F | HEART RATE: 68 BPM | DIASTOLIC BLOOD PRESSURE: 62 MMHG | OXYGEN SATURATION: 100 %

## 2019-08-29 DIAGNOSIS — H93.8X2 OTHER SPECIFIED DISORDERS OF LEFT EAR: ICD-10-CM

## 2019-08-29 PROCEDURE — 99282 EMERGENCY DEPT VISIT SF MDM: CPT

## 2019-08-29 NOTE — ED PROVIDER NOTE - PATIENT PORTAL LINK FT
You can access the FollowMyHealth Patient Portal offered by [facility] by registering at the following website: [email or web address]. By joining [employer]’s FollowMyHealth portal, you will also be able to view your health information using other applications (apps) compatible with our system.

## 2019-08-29 NOTE — ED PROVIDER NOTE - OBJECTIVE STATEMENT
48 yo male presents to the ED with concern of ear piece stuck in his left ear. Patient explains he was listening to his walkman and noticed the left plastic ear bud was missing. States hes not sure if its in his ear or if it fell off. Denies fever, chills, ear pain, hearing changes, discharge, headache, dizziness.

## 2019-08-29 NOTE — ED ADULT TRIAGE NOTE - CHIEF COMPLAINT QUOTE
"I was listening to my walkman and I think the ear piece is stuck in my ear"  no obvious foreign object in ear

## 2019-08-29 NOTE — ED PROVIDER NOTE - PHYSICAL EXAMINATION
GENERAL:  well appearing, non-toxic male in no acute distress  SKIN: skin warm, pink and dry. MMM.   ENT:  Airway intact. Patent oropharynx without erythema or exudate. Uvula midline. TMs clear b/l, no foreign body.   PULM: CTAB. Normal respiratory effort. No respiratory distress. No wheezes, stridor, rales or rhonchi. No retractions  CV: RRR, no M/R/G.   NEURO: A+Ox3, no sensory/motor deficits  PSYCH: appropriate behavior, cooperative.

## 2019-08-29 NOTE — ED PROVIDER NOTE - NS ED ROS FT
Constitutional: no fever, chills   ENT: no URI sxs, no throat pain, no change in voice, no excessive drooling, no ear pain/discharge, no hearing changes.   Cardiovascular: no chest pain, no sob  Respiratory: no cough, no shortness of breath  Gastrointestinal: no nausea, vomiting   Integumentary: no rash or skin changes. no edema  Neurological: no headache, no dizziness, no visual changes, no UE/LE weakness or paresthesias.

## 2019-08-29 NOTE — ED PROVIDER NOTE - CLINICAL SUMMARY MEDICAL DECISION MAKING FREE TEXT BOX
Patient here with concern of possible foreign body in ear. On exam, no foreign body, unremarkable ear exam.

## 2019-08-30 NOTE — ED ADULT NURSE NOTE - PRO INTERPRETER NEED 2
----- Message from Maxine Tejada sent at 3/14/2017  1:00 PM PDT -----  Regarding: wants lab orders faxed to office  SAIDA/Lisa        Patient would like his 01/24 labs faxed to [facility] at [phone]. Patient can be reached at [phone].  
done  
English

## 2019-08-30 NOTE — ED ADULT NURSE NOTE - CHPI ED NUR SYMPTOMS NEG
no decreased eating/drinking/no dizziness/no nausea/no pain/no vomiting/no fever/no weakness/no chills/no tingling

## 2019-08-31 ENCOUNTER — EMERGENCY (EMERGENCY)
Facility: HOSPITAL | Age: 49
LOS: 0 days | Discharge: HOME | End: 2019-08-31
Admitting: EMERGENCY MEDICINE

## 2019-08-31 VITALS
RESPIRATION RATE: 20 BRPM | SYSTOLIC BLOOD PRESSURE: 107 MMHG | TEMPERATURE: 97 F | WEIGHT: 175.05 LBS | DIASTOLIC BLOOD PRESSURE: 59 MMHG | OXYGEN SATURATION: 96 % | HEART RATE: 75 BPM | HEIGHT: 70 IN

## 2019-08-31 DIAGNOSIS — F41.9 ANXIETY DISORDER, UNSPECIFIED: ICD-10-CM

## 2019-08-31 NOTE — ED ADULT NURSE NOTE - HPI (INCLUDE ILLNESS QUALITY, SEVERITY, DURATION, TIMING, CONTEXT, MODIFYING FACTORS, ASSOCIATED SIGNS AND SYMPTOMS)
Pt presents well kept, dressed appropriately, appropriate mood. States unable to fall asleep and feels anxious. He is from [facility], said they don't wanna give anything

## 2019-08-31 NOTE — ED ADULT NURSE NOTE - OBJECTIVE STATEMENT
Pt states "feel anxious, unable to fall asleep, pace around", Denies suicidal or homicidal ideation.

## 2019-08-31 NOTE — ED ADULT NURSE NOTE - NSIMPLEMENTINTERV_GEN_ALL_ED
Implemented All Universal Safety Interventions:  [place] to call system. Call bell, personal items and telephone within reach. Instruct patient to call for assistance. Room bathroom lighting operational. Non-slip footwear when patient is off stretcher. Physically safe environment: no spills, clutter or unnecessary equipment. Stretcher in lowest position, wheels locked, appropriate side rails in place.

## 2019-10-13 ENCOUNTER — EMERGENCY (EMERGENCY)
Facility: HOSPITAL | Age: 49
LOS: 0 days | Discharge: HOME | End: 2019-10-13
Attending: EMERGENCY MEDICINE | Admitting: EMERGENCY MEDICINE
Payer: MEDICAID

## 2019-10-13 VITALS
SYSTOLIC BLOOD PRESSURE: 112 MMHG | RESPIRATION RATE: 17 BRPM | DIASTOLIC BLOOD PRESSURE: 62 MMHG | HEIGHT: 69 IN | HEART RATE: 72 BPM | OXYGEN SATURATION: 97 % | WEIGHT: 205.91 LBS | TEMPERATURE: 98 F

## 2019-10-13 DIAGNOSIS — M67.472 GANGLION, LEFT ANKLE AND FOOT: ICD-10-CM

## 2019-10-13 DIAGNOSIS — H05.89 OTHER DISORDERS OF ORBIT: ICD-10-CM

## 2019-10-13 PROCEDURE — 99283 EMERGENCY DEPT VISIT LOW MDM: CPT

## 2019-10-13 RX ORDER — IBUPROFEN 200 MG
600 TABLET ORAL ONCE
Refills: 0 | Status: COMPLETED | OUTPATIENT
Start: 2019-10-13 | End: 2019-10-13

## 2019-10-13 RX ADMIN — Medication 600 MILLIGRAM(S): at 04:08

## 2019-10-13 NOTE — ED ADULT TRIAGE NOTE - CHIEF COMPLAINT QUOTE
I have a polyp in the side of my eye (left)  for about a year and a lump on my foot for about four or five months (left). I just want to get it checked out - patient

## 2019-10-13 NOTE — ED PROVIDER NOTE - OBJECTIVE STATEMENT
49yM with PMH bipolar p/w neck stiffness this evening after sleeping at an angle on his pillow, in addition to concerns about periorbital lesion he has had for about 2 years and small mass on L foot for past 5 months. neck pain is mild gradual onset aching nonradiating constant better with stretching. The foot and eye issues have been stable for the duration of time he has had them, but he states he just wanted to get them checked to be cautious.

## 2019-10-13 NOTE — ED PROVIDER NOTE - NSFOLLOWUPCLINICS_GEN_ALL_ED_FT
[facility]  Ophthalmolgy  [address], [address]  [place] [address]  Phone: [phone]  Fax:   Follow Up Time: Routine    [facility]  Orthpedic  [address] [place], [place]   Phone: [phone]  Fax:   Follow Up Time: Routine

## 2019-10-13 NOTE — ED PROVIDER NOTE - PHYSICAL EXAMINATION
Vital Signs: I have reviewed the initial vital signs.  Constitutional: NAD, well-nourished, appears stated age, no acute distress.  HEENT: Airway patent, moist MM, no erythema/swelling/deformity of oral structures. EOMI, PERRLA.  CV: regular rate, regular rhythm, well-perfused extremities, 2+ b/l DP and radial pulses equal.  Lungs: BCTA, no increased WOB.  ABD: NTND, no guarding or rebound, no pulsatile mass, no hernias.   MSK: Neck supple, nontender, nl ROM, no stepoff. Chest nontender. Back nontender in TLS spine or to b/l bony structures or flanks. Ext nontender, nl rom, no deformity. +anterolateral foot mass about 2X2cm  INTEG: Skin warm, dry, no rash. small 2mm papular nonerythematous lesion to area just lateral to lateral canthus of L eye  NEURO: A&Ox3, moving all extremities, normal speech  PSYCH: Calm, cooperative, normal affect and interaction.

## 2019-10-13 NOTE — ED PROVIDER NOTE - ATTENDING CONTRIBUTION TO CARE
50 yo male h/o bipolar disease presenting for evaluation of chronic ganglion cyst on the dorsum of his left foot and a chronic small growth inferior to the left lower eyelid laterally.  No signs of infection, rest of exam unremarkable, patient was advised to follow up with ophtho and ortho clinics, he was happy with the plan.

## 2019-10-13 NOTE — ED PROVIDER NOTE - CLINICAL SUMMARY MEDICAL DECISION MAKING FREE TEXT BOX
48 yo male  here for a check up for his chronic problems,  Well-appearing, no work up needed, appropriate follow up given.

## 2019-10-13 NOTE — ED ADULT NURSE NOTE - OBJECTIVE STATEMENT
pt complaining of a polyp on his left eye for about 1 year and a bump on his left foot for about 4 months.

## 2019-10-13 NOTE — ED PROVIDER NOTE - NS ED ROS FT
Eyes:  No visual changes, eye pain or discharge.  ENMT:  No hearing changes, pain, no sore throat or runny nose, no difficulty swallowing  Cardiac:  No chest pain, SOB or edema. No chest pain with exertion.  Respiratory:  No cough or respiratory distress.    GI:  No nausea, vomiting, diarrhea or abdominal pain.  :  No dysuria, frequency or burning.  MS:  No myalgia, muscle weakness, joint pain or back pain.  Neuro:  No headache or weakness.  No LOC.  Skin:  No skin rash. +eye lesion, +foot mass  Endocrine: No history of thyroid disease or diabetes.

## 2019-10-13 NOTE — ED PROVIDER NOTE - PATIENT PORTAL LINK FT
You can access the FollowMyHealth Patient Portal offered by [facility] by registering at the following website: [email or web address]. By joining [employer]’s FollowMyHealth portal, you will also be able to view your health information using other applications (apps) compatible with our system.

## 2019-10-13 NOTE — ED ADULT NURSE NOTE - NSIMPLEMENTINTERV_GEN_ALL_ED
Implemented All Universal Safety Interventions:  [place] to call system. Call bell, personal items and telephone within reach. Instruct patient to call for assistance. Room bathroom lighting operational. Non-slip footwear when patient is off stretcher. Physically safe environment: no spills, clutter or unnecessary equipment. Stretcher in lowest position, wheels locked, appropriate side rails in place.

## 2019-10-13 NOTE — ED PROVIDER NOTE - NSFOLLOWUPINSTRUCTIONS_ED_ALL_ED_FT
Patient to be discharged from ED. Any available test results were discussed with patient and/or family. Verbal instructions given, including instructions to return to ED immediately for any new, worsening, or concerning symptoms. Patient endorsed understanding. Written discharge instructions additionally given, including follow-up plan.    SydnieianSpanish[place]nammaurizio    Ganglion Cyst  Image   A ganglion cyst is a non-cancerous, fluid-filled lump that occurs near a joint or tendon. The cyst grows out of a joint or the lining of a tendon. Ganglion cysts most often develop in the hand or wrist, but they can also develop in the shoulder, elbow, hip, knee, ankle, or foot.  Ganglion cysts are ball-shaped or egg-shaped. Their size can range from the size of a pea to larger than a grape. Increased activity may cause the cyst to get bigger because more fluid starts to build up.  What are the causes?  The exact cause of this condition is not known, but it may be related to:  Inflammation or irritation around the joint.An injury.Repetitive movements or overuse.Arthritis.What increases the risk?  You are more likely to develop this condition if:  You are a woman.You are 15–40 years old.What are the signs or symptoms?  The main symptom of this condition is a lump. It most often appears on the hand or wrist. In many cases, there are no other symptoms, but a cyst can sometimes cause:  Tingling.Pain.Numbness.Muscle weakness.Weak .Less range of motion in a joint.How is this diagnosed?  Ganglion cysts are usually diagnosed based on a physical exam. Your health care provider will feel the lump and may shine a light next to it. If it is a ganglion cyst, the light will likely shine through it.  Your health care provider may order an X-ray, ultrasound, or MRI to rule out other conditions.  How is this treated?  Ganglion cysts often go away on their own without treatment. If you have pain or other symptoms, treatment may be needed. Treatment is also needed if the ganglion cyst limits your movement or if it gets infected. Treatment may include:  Wearing a brace or splint on your wrist or finger.Taking anti-inflammatory medicine.Having fluid drained from the lump with a needle (aspiration).Getting a steroid injected into the joint.Having surgery to remove the ganglion cyst.Placing a pad on your shoe or wearing shoes that will not rub against the cyst if it is on your foot.Follow these instructions at home:  Do not press on the ganglion cyst, poke it with a needle, or hit it.Take over-the-counter and prescription medicines only as told by your health care provider.If you have a brace or splint:   Wear it as told by your health care provider.Remove it as told by your health care provider. Ask if you need to remove it when you take a shower or a bath.Watch your ganglion cyst for any changes.Keep all follow-up visits as told by your health care provider. This is important.Contact a health care provider if:  Your ganglion cyst becomes larger or more painful.You have pus coming from the lump.You have weakness or numbness in the affected area.You have a fever or chills.Get help right away if:  You have a fever and have any of these in the cyst area:  Increased redness.Red streaks.Swelling.Summary  A ganglion cyst is a non-cancerous, fluid-filled lump that occurs near a joint or tendon.Ganglion cysts most often develop in the hand or wrist, but they can also develop in the shoulder, elbow, hip, knee, ankle, or foot.Ganglion cysts often go away on their own without treatment.This information is not intended to replace advice given to you by your health care provider. Make sure you discuss any questions you have with your health care provider.    Document Released: 12/15/2001 Document Revised: 08/17/2018 Document Reviewed: 08/17/2018  [employer] Interactive Patient Education © 2019 [employer]

## 2019-11-05 ENCOUNTER — EMERGENCY (EMERGENCY)
Facility: HOSPITAL | Age: 49
LOS: 0 days | Discharge: HOME | End: 2019-11-05
Admitting: EMERGENCY MEDICINE
Payer: MEDICAID

## 2019-11-05 VITALS
HEART RATE: 88 BPM | RESPIRATION RATE: 97 BRPM | OXYGEN SATURATION: 98 % | TEMPERATURE: 99 F | WEIGHT: 207.9 LBS | SYSTOLIC BLOOD PRESSURE: 135 MMHG | HEIGHT: 60 IN | DIASTOLIC BLOOD PRESSURE: 58 MMHG

## 2019-11-05 DIAGNOSIS — Z79.1 LONG TERM (CURRENT) USE OF NON-STEROIDAL ANTI-INFLAMMATORIES (NSAID): ICD-10-CM

## 2019-11-05 DIAGNOSIS — Z79.899 OTHER LONG TERM (CURRENT) DRUG THERAPY: ICD-10-CM

## 2019-11-05 DIAGNOSIS — J00 ACUTE NASOPHARYNGITIS [COMMON COLD]: ICD-10-CM

## 2019-11-05 DIAGNOSIS — Z79.2 LONG TERM (CURRENT) USE OF ANTIBIOTICS: ICD-10-CM

## 2019-11-05 DIAGNOSIS — J32.9 CHRONIC SINUSITIS, UNSPECIFIED: ICD-10-CM

## 2019-11-05 PROCEDURE — 99283 EMERGENCY DEPT VISIT LOW MDM: CPT

## 2019-11-05 RX ORDER — IBUPROFEN 200 MG
1 TABLET ORAL
Qty: 40 | Refills: 0
Start: 2019-11-05 | End: 2019-11-14

## 2019-11-05 RX ORDER — IBUPROFEN 200 MG
600 TABLET ORAL ONCE
Refills: 0 | Status: COMPLETED | OUTPATIENT
Start: 2019-11-05 | End: 2019-11-05

## 2019-11-05 RX ORDER — AZITHROMYCIN 500 MG/1
1 TABLET, FILM COATED ORAL
Qty: 6 | Refills: 0
Start: 2019-11-05 | End: 2019-11-09

## 2019-11-05 RX ADMIN — Medication 600 MILLIGRAM(S): at 02:00

## 2019-11-05 NOTE — ED ADULT NURSE NOTE - OBJECTIVE STATEMENT
pt c/o of sinus pressure, nasal congestion and headache, denies any fever and in no acute respiratory distress.

## 2019-11-05 NOTE — ED PROVIDER NOTE - PHYSICAL EXAMINATION
CONST: Well appearing in NAD  EYES: PERRL, EOMI, Sclera and conjunctiva clear.   ENT: + sinus tenderness, + nasal discharge. TM's clear. Oropharynx normal appearing, no erythema or exudates. No abscess or swelling. Uvula midline. No temporal artery or mastoid tenderness.  NECK: Non-tender, no meningeal signs. normal ROM. supple   CARD: Normal S1 S2; Normal rate and rhythm  RESP: Equal BS B/L, No wheezes, rhonchi or rales. No distress  GI: Soft, non-tender, non-distended. no cva tenderness. normal BS  SKIN: Warm, dry, no acute rashes. Good turgor  NEURO: A&Ox3, No focal deficits. Strength 5/5 with no sensory deficits. Steady gait.

## 2019-11-05 NOTE — ED PROVIDER NOTE - NS ED ROS FT
Review of Systems:  	•	CONSTITUTIONAL - no fever, no diaphoresis, no chills  	•	SKIN - no rash  	•	HEMATOLOGIC - no bleeding, no bruising  	•	EYES - no eye pain, no blurry vision  	•	ENT - no change in hearing, no sore throat, no ear pain or tinnitus  	•	RESPIRATORY - no shortness of breath, no cough  	•	CARDIAC - no chest pain, no palpitations  	•	GI - no abd pain, no nausea, no vomiting, no diarrhea, no constipation  	•	GENITO-URINARY - no discharge, no dysuria; no hematuria, no increased urinary frequency  	•	MUSCULOSKELETAL - no joint paint, no swelling, no redness  	•	NEUROLOGIC - no weakness, + headache, no paresthesias, no LOC

## 2019-11-05 NOTE — ED ADULT NURSE NOTE - NS ED NURSE LEVEL OF CONSCIOUSNESS AFFECT
Calm General Sunscreen Counseling: I recommended a broad spectrum sunscreen with a SPF of 30 or higher.  I explained that SPF 30 sunscreens block approximately 97 percent of the sun's harmful rays.  Sunscreens should be applied at least 15 minutes prior to expected sun exposure and then every 2 hours after that as long as sun exposure continues. If swimming or exercising sunscreen should be reapplied every 45 minutes to an hour after getting wet or sweating.  One ounce, or the equivalent of a shot glass full of sunscreen, is adequate to protect the skin not covered by a bathing suit. I also recommended a lip balm with a sunscreen as well. Sun protective clothing can be used in lieu of sunscreen but must be worn the entire time you are exposed to the sun's rays. Detail Level: Detailed

## 2019-11-05 NOTE — ED PROVIDER NOTE - PATIENT PORTAL LINK FT
You can access the FollowMyHealth Patient Portal offered by [facility] by registering at the following website: [email or web address]. By joining [employer]’s FollowMyHealth portal, you will also be able to view your health information using other applications (apps) compatible with our system.

## 2019-11-05 NOTE — ED PROVIDER NOTE - OBJECTIVE STATEMENT
49 year old male with pmhx noted presents with sinus congestion and HA x 1 day. no fever or chills. no abd pain, N/V/D, sore throat, ear pain, or cough.

## 2019-11-14 ENCOUNTER — EMERGENCY (EMERGENCY)
Facility: HOSPITAL | Age: 49
LOS: 0 days | Discharge: HOME | End: 2019-11-14
Admitting: EMERGENCY MEDICINE
Payer: MEDICAID

## 2019-11-14 VITALS
OXYGEN SATURATION: 99 % | DIASTOLIC BLOOD PRESSURE: 59 MMHG | SYSTOLIC BLOOD PRESSURE: 110 MMHG | HEART RATE: 73 BPM | RESPIRATION RATE: 18 BRPM | TEMPERATURE: 96 F

## 2019-11-14 DIAGNOSIS — K08.89 OTHER SPECIFIED DISORDERS OF TEETH AND SUPPORTING STRUCTURES: ICD-10-CM

## 2019-11-14 PROCEDURE — 99283 EMERGENCY DEPT VISIT LOW MDM: CPT

## 2019-11-14 RX ORDER — IBUPROFEN 200 MG
600 TABLET ORAL ONCE
Refills: 0 | Status: COMPLETED | OUTPATIENT
Start: 2019-11-14 | End: 2019-11-14

## 2019-11-14 RX ORDER — IBUPROFEN 200 MG
1 TABLET ORAL
Qty: 21 | Refills: 0
Start: 2019-11-14 | End: 2019-11-20

## 2019-11-14 RX ADMIN — Medication 600 MILLIGRAM(S): at 02:10

## 2019-11-14 NOTE — ED ADULT NURSE NOTE - NSIMPLEMENTINTERV_GEN_ALL_ED
Implemented All Universal Safety Interventions:  [place] to call system. Call bell, personal items and telephone within reach. Instruct patient to call for assistance. Room bathroom lighting operational. Non-slip footwear when patient is off stretcher. Physically safe environment: no spills, clutter or unnecessary equipment. Stretcher in lowest position, wheels locked, appropriate side rails in place.

## 2019-11-14 NOTE — ED PROVIDER NOTE - OBJECTIVE STATEMENT
pt c/o lower front tooth pain; has appt with dentist; has abx but requests pain meds  pain is sharp, nonradiating, moderate  denies exacerbating or relieving factors  Denies fever/chill/HA/dizziness/chest pain/palpitation/sob/abd pain/n/v/d/ black stool/bloody stool/urinary sxs pt c/o lower front tooth pain; has appt with dentist; has abx but requests pain meds; pain is sharp, nonradiating, moderate. denies exacerbating or relieving factors. Denies fever/chill/HA/dizziness/chest pain/palpitation/sob/abd pain/n/v/d/ black stool/bloody stool/urinary sxs

## 2019-11-14 NOTE — ED PROVIDER NOTE - PATIENT PORTAL LINK FT
You can access the FollowMyHealth Patient Portal offered by [facility] by registering at the following website: [email or web address]. By joining [employer]’s FollowMyHealth portal, you will also be able to view your health information using other applications (apps) compatible with our system.

## 2019-11-14 NOTE — ED PROVIDER NOTE - PHYSICAL EXAMINATION
CONSTITUTIONAL: Well-appearing; well-nourished; in no apparent distress.   ENT: poor dentition; normal nose; no rhinorrhea; normal pharynx with no tonsillar hypertrophy.   NECK: Supple; non-tender; no cervical lymphadenopathy. No JVD.   CARDIOVASCULAR: Normal S1, S2; no murmurs, rubs, or gallops.   RESPIRATORY: Normal chest excursion with respiration; breath sounds clear and equal bilaterally; no wheezes, rhonchi, or rales.  GI/: Normal bowel sounds; non-distended; non-tender; no palpable organomegaly.   MS: No evidence of trauma or deformity. Non-tender to palpation. No scoliosis. No CVA tenderness. Normal ROM in all four extremities; non-tender to palpation; distal pulses are normal.   SKIN: Normal for age and race; warm; dry; good turgor; no apparent lesions or exudate.   NEURO/PSYCH: A & O x 4; grossly unremarkable. mood and manner are appropriate. Grooming and personal hygiene are appropriate. No apparent thoughts of harm to self or others.

## 2019-11-14 NOTE — ED PROVIDER NOTE - NSFOLLOWUPCLINICS_GEN_ALL_ED_FT
[facility]  Dental  [address] [place] [id number]  Phone: [phone]  Fax:   Follow Up Time:

## 2019-12-11 ENCOUNTER — OUTPATIENT (OUTPATIENT)
Dept: OUTPATIENT SERVICES | Facility: HOSPITAL | Age: 49
LOS: 1 days | Discharge: HOME | End: 2019-12-11

## 2019-12-11 DIAGNOSIS — F20.9 SCHIZOPHRENIA, UNSPECIFIED: ICD-10-CM

## 2019-12-11 DIAGNOSIS — Z79.899 OTHER LONG TERM (CURRENT) DRUG THERAPY: ICD-10-CM

## 2019-12-30 NOTE — ED ADULT NURSE NOTE - CAS TRG GEN SKIN COLOR
Writer faxed written request to [facility] &  Medical Records to request of imaging & tilt.  Writer awaiting records.       Normal for race

## 2020-01-01 NOTE — ED ADULT NURSE NOTE - PAIN RATING/NUMBER SCALE (0-10): ACTIVITY
"    [facility] does not have a PEDIATRIC EMERGENCY ROOM, PEDIATRIC UNIT OR  PEDIATRIC INTENSIVE CARE UNIT.     "Your feedback is important to us. If you should receive a survey in the next few days, please share your experience with us."     "
0

## 2020-03-01 ENCOUNTER — EMERGENCY (EMERGENCY)
Facility: HOSPITAL | Age: 50
LOS: 0 days | Discharge: LEFT BEFORE DISCHARGE | End: 2020-03-01
Attending: EMERGENCY MEDICINE | Admitting: EMERGENCY MEDICINE
Payer: MEDICAID

## 2020-03-01 VITALS
TEMPERATURE: 97 F | SYSTOLIC BLOOD PRESSURE: 128 MMHG | HEIGHT: 69 IN | DIASTOLIC BLOOD PRESSURE: 70 MMHG | HEART RATE: 64 BPM | RESPIRATION RATE: 18 BRPM | OXYGEN SATURATION: 97 % | WEIGHT: 212.08 LBS

## 2020-03-01 DIAGNOSIS — T45.0X1A POISONING BY ANTIALLERGIC AND ANTIEMETIC DRUGS, ACCIDENTAL (UNINTENTIONAL), INITIAL ENCOUNTER: ICD-10-CM
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
## 2020-03-01 DIAGNOSIS — T39.311A POISONING BY PROPIONIC ACID DERIVATIVES, ACCIDENTAL (UNINTENTIONAL), INITIAL ENCOUNTER: ICD-10-CM
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
## 2020-03-01 PROCEDURE — L9991: CPT

## 2020-03-01 NOTE — ED ADULT NURSE REASSESSMENT NOTE - NS ED NURSE REASSESS COMMENT FT1
patient triaged and escorted by EMS with RN to critical care [address] for overdose. Sortly after patient is no longer at bedside.  All bathroom checked and patient is nowhere to be found.  RN Called Police spoke with Sgt. Wendi yanez #[id number].  Officers arrived on scene at this time and will search area.  Charge RN aware.

## 2020-03-01 NOTE — ED ADULT TRIAGE NOTE - CHIEF COMPLAINT QUOTE
Patient reports taking 10 Benadryl caps unknown dosage and 6 tablet of Ibuprofen 200mg each.  Patient denies SI/HI

## 2020-08-04 NOTE — ED PROVIDER NOTE - NS_EDPROVIDERDISPOUSERTYPE_ED_A_ED
Wife confirms dose. Denies any green intake. No changes in exercise. Upon further questioning, prednisone discontinued 7/12/20, likely cause for drop in INR. Will increase.   Attending Attestation (For Attendings USE Only)...

## 2021-01-09 ENCOUNTER — EMERGENCY (EMERGENCY)
Facility: HOSPITAL | Age: 51
LOS: 0 days | Discharge: LEFT BEFORE DISCHARGE | End: 2021-01-09
Admitting: EMERGENCY MEDICINE
Payer: MEDICAID

## 2021-01-09 VITALS — HEIGHT: 69 IN

## 2021-01-09 DIAGNOSIS — Z00.00 ENCOUNTER FOR GENERAL ADULT MEDICAL EXAMINATION WITHOUT ABNORMAL FINDINGS: ICD-10-CM

## 2021-01-09 PROCEDURE — L9991: CPT

## 2021-04-20 NOTE — ED ADULT TRIAGE NOTE - CADM TRG TX PRIOR TO ARRIVAL
85 y/o M with extensive PMH including HTN, HLD, CAD, HFpEF, s/p Right CEA for Carotid artery disease, ESRD on HD 2d/week (M/Fri) via LUE fistula, s/p flecainide w/ ILR placed 6/2020, AS s/p TAVR, and PAD (RLE fem-pop bypass October 2020) who presented to [facility] on 3/13/21 with GI bleed, as well as LLE pain due to occulsion of L superficial fem artery.  He had a complicated hospital course and also had acute DVT, PNA, bilateral pleural effusions.  Pt apparently was not a candidate for an IVC filter.  #ESRD on HD  -HD MWF  #PAD  -s/p fem-pop bypass and revision  -Continue Plavix    #Acute left femoral vein DVT  -Not on full dose AC due to GIB and high risk. If H/H remains stable and pt without recurrent episodes, we may restart AC later on. Recommend discussing with family  -DVT ppx with HSQ  -Per chart - patient not a candidate for IVC filter??    #Acute blood loss anemia, stable, likely due to UGIB  -EGD showed multiple AVMs, cauterized during EGD  -Protonix 40mg po bid  -Monitor Hg/Hct, transfuse if Hg <8  -EPO with dialysis on M + F    #Chronic diastolic CHF, stable  #Bioprosthetic aortic valve  -TTE done 3/14/2021; reviewed reading  -AV normally functioning  -continue Cardizem 240mg daily    #HTN  -Chronic fluctuating SBP  -Continue Hydralazine 25mg BID for now and closely monitor BP  -C/w Imdur w/ holding parameters  -Continue Cardizem   -Monitor vitals    #DM-II, A1c 5.7 3/26  - ISS  - fingersticks q ac and hs    #BPH  -continue Flomax  none

## 2021-08-17 ENCOUNTER — APPOINTMENT (OUTPATIENT)
Dept: OPHTHALMOLOGY | Facility: CLINIC | Age: 51
End: 2021-08-17

## 2021-08-17 PROBLEM — Z00.00 ENCOUNTER FOR PREVENTIVE HEALTH EXAMINATION: Status: ACTIVE | Noted: 2021-08-17

## 2021-09-15 NOTE — ED ADULT NURSE NOTE - HOW OFTEN DO YOU HAVE A DRINK CONTAINING ALCOHOL?
Never

## 2021-12-25 ENCOUNTER — EMERGENCY (EMERGENCY)
Facility: HOSPITAL | Age: 51
LOS: 0 days | Discharge: HOME | End: 2021-12-25
Attending: EMERGENCY MEDICINE | Admitting: EMERGENCY MEDICINE
Payer: MEDICAID

## 2021-12-25 VITALS
SYSTOLIC BLOOD PRESSURE: 136 MMHG | WEIGHT: 190.04 LBS | RESPIRATION RATE: 17 BRPM | HEIGHT: 69 IN | OXYGEN SATURATION: 99 % | DIASTOLIC BLOOD PRESSURE: 78 MMHG | HEART RATE: 80 BPM | TEMPERATURE: 98 F

## 2021-12-25 DIAGNOSIS — F31.9 BIPOLAR DISORDER, UNSPECIFIED: ICD-10-CM
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
## 2021-12-25 DIAGNOSIS — S60.511A ABRASION OF RIGHT HAND, INITIAL ENCOUNTER: ICD-10-CM

## 2021-12-25 DIAGNOSIS — F17.200 NICOTINE DEPENDENCE, UNSPECIFIED, UNCOMPLICATED: ICD-10-CM

## 2021-12-25 PROCEDURE — 99282 EMERGENCY DEPT VISIT SF MDM: CPT

## 2021-12-25 NOTE — ED PROVIDER NOTE - SKIN, MLM
+ right palmar hand with dirt noted, multiple superficial abrasions noted; no erythema ; no warmth ; no rash

## 2021-12-25 NOTE — ED PROVIDER NOTE - CARE PLAN
Principal Discharge DX:	Abrasion of skin of right hand   1 Principal Discharge DX:	Abrasion of skin of right hand  Assessment and plan of treatment:	Plan: Wound care, reassess.

## 2021-12-25 NOTE — ED PROVIDER NOTE - CLINICAL SUMMARY MEDICAL DECISION MAKING FREE TEXT BOX
Patient is a good candidate to attempt outpatient management. Supportive care and home care discussed in detail. Patient aware they may have to return for re-evaluation  if outpatient treatment fails. Strict return precautions discussed.

## 2021-12-25 NOTE — ED PROVIDER NOTE - NSFOLLOWUPCLINICS_GEN_ALL_ED_FT
[facility]  Medicine  [address], [place]   Phone: [phone]  Fax:   Follow Up Time: Routine

## 2021-12-25 NOTE — ED PROVIDER NOTE - OBJECTIVE STATEMENT
50 y/o M, PMHx Bipolar Disorder (resides at [facility]), presents to the ED with complaints of right hand abrasions x several days. He is unsure how he attained abrasions and states that he ''just woke up like this.'' He is requested for these abrasions to be cleansed and wrapped with an ACE. He denies any fever, chills and decreased ROM.

## 2021-12-25 NOTE — ED PROVIDER NOTE - PROGRESS NOTE DETAILS
ED Attending OBI Cross  pt hand cleansed, bacitracin applies, dressed, pt aware of proper wound care, n/v intact, wants to go home, aware of signs and symptoms to return for, will follow up.

## 2021-12-25 NOTE — ED PROVIDER NOTE - PATIENT PORTAL LINK FT
You can access the FollowMyHealth Patient Portal offered by [facility] by registering at the following website: [email or web address]. By joining [employer]’s FollowMyHealth portal, you will also be able to view your health information using other applications (apps) compatible with our system.

## 2021-12-25 NOTE — ED PROVIDER NOTE - ATTENDING CONTRIBUTION TO CARE
52 y/o m w/ pmhx of bipolar disorder from [facility] presents with R hand abrasions x few days, not sure how it got there, states he washes hands a lot. no pain, no bleeding. no weakness, pt requesting someone wash it and wrap it with bacitracin. does not want imaging.  No fever, chills, nausea, vomiting, numbness/tingling, decreased sensation, lacerations, echyymoses, hearing a click/feeling a pop, and pt denies trauma.    on exam: WDWN appearing, No swelling, lacerations, ecchymoses. (+) R palmar hand with dirt noted, superficial abrasions noted. no erythema  or streaking, no warmth to palpation, no crepitus, induration, fluctuance, no discharge, no signs of trauma, no abscess, (+) soft compartments. Radial pulses 2/4 b/l. Cap refill < 2 seconds, No obvious bony deformity. Good finger opposition, FROM of b/l wrists in flexion, extension, ulna and radial deviation. No snuff box tenderness.    FROM of b/l elbows in flexion, extensions, supination and pronation, and b/l shoulders in flexion, extension, abduction, and adduction with no pain to palpation.

## 2022-03-04 ENCOUNTER — EMERGENCY (EMERGENCY)
Facility: HOSPITAL | Age: 52
LOS: 0 days | Discharge: HOME | End: 2022-03-05
Attending: EMERGENCY MEDICINE | Admitting: EMERGENCY MEDICINE
Payer: MEDICAID

## 2022-03-04 VITALS
OXYGEN SATURATION: 100 % | HEART RATE: 82 BPM | TEMPERATURE: 98 F | HEIGHT: 69 IN | DIASTOLIC BLOOD PRESSURE: 78 MMHG | SYSTOLIC BLOOD PRESSURE: 144 MMHG | RESPIRATION RATE: 18 BRPM

## 2022-03-04 DIAGNOSIS — F31.9 BIPOLAR DISORDER, UNSPECIFIED: ICD-10-CM

## 2022-03-04 DIAGNOSIS — F17.200 NICOTINE DEPENDENCE, UNSPECIFIED, UNCOMPLICATED: ICD-10-CM

## 2022-03-04 DIAGNOSIS — Z76.0 ENCOUNTER FOR ISSUE OF REPEAT PRESCRIPTION: ICD-10-CM

## 2022-03-04 DIAGNOSIS — F25.9 SCHIZOAFFECTIVE DISORDER, UNSPECIFIED: ICD-10-CM

## 2022-03-04 PROCEDURE — 99283 EMERGENCY DEPT VISIT LOW MDM: CPT

## 2022-03-04 RX ORDER — TRAZODONE HCL 50 MG
50 TABLET ORAL ONCE
Refills: 0 | Status: COMPLETED | OUTPATIENT
Start: 2022-03-04 | End: 2022-03-04

## 2022-03-04 RX ORDER — VALPROIC ACID (AS SODIUM SALT) 250 MG/5ML
500 SOLUTION, ORAL ORAL ONCE
Refills: 0 | Status: COMPLETED | OUTPATIENT
Start: 2022-03-04 | End: 2022-03-04

## 2022-03-04 RX ORDER — FLUPHENAZINE HYDROCHLORIDE 1 MG/1
10 TABLET, FILM COATED ORAL ONCE
Refills: 0 | Status: COMPLETED | OUTPATIENT
Start: 2022-03-04 | End: 2022-03-04

## 2022-03-04 RX ORDER — BENZTROPINE MESYLATE 1 MG
1 TABLET ORAL ONCE
Refills: 0 | Status: COMPLETED | OUTPATIENT
Start: 2022-03-04 | End: 2022-03-04

## 2022-03-04 RX ADMIN — FLUPHENAZINE HYDROCHLORIDE 10 MILLIGRAM(S): 1 TABLET, FILM COATED ORAL at 23:59

## 2022-03-04 RX ADMIN — Medication 1 MILLIGRAM(S): at 23:59

## 2022-03-04 NOTE — ED PROVIDER NOTE - CLINICAL SUMMARY MEDICAL DECISION MAKING FREE TEXT BOX
Patient evaluated in ED, no complaints.  Patient, cooperative.  Given his night medications in ED.  Discharged to residence via transport.  Advised to follow-up with PMD and patient agreed.  Return precautions advised and patient verbalized understanding.

## 2022-03-04 NOTE — ED ADULT NURSE NOTE - NSIMPLEMENTINTERV_GEN_ALL_ED
Implemented All Universal Safety Interventions:  [place] to call system. Call bell, personal items and telephone within reach. Instruct patient to call for assistance. Room bathroom lighting operational. Non-slip footwear when patient is off stretcher. Physically safe environment: no spills, clutter or unnecessary equipment. Stretcher in lowest position, wheels locked, appropriate side rails in place.

## 2022-03-04 NOTE — ED PROVIDER NOTE - NSFOLLOWUPINSTRUCTIONS_ED_ALL_ED_FT
Schizoaffective Disorder    WHAT YOU NEED TO KNOW:    Schizoaffective disorder is a long-term mental illness that may change how you think, feel, and act around others. You may not know what is real and what is not real.     DISCHARGE INSTRUCTIONS:    Medicines:   •Antipsychotics: These medicines help decrease psychotic symptoms or severe agitation. You may need antiparkinson medicine to control muscle stiffness, twitches, and restlessness caused by antipsychotic medicines.  •Antianxiety medicine: This medicine may be given to decrease anxiety and help you feel calm and relaxed.   •Antidepressants: These medicines are given to decrease or stop the symptoms of depression, anxiety, and behavior problems.   •Mood stabilizers:  These medicines help control mood swings.  •Anticonvulsants: This medicine is given to control seizures. It may also be used to decrease violent behavior and control your mood swings.  •Blood pressure medicines: These may be used to help decrease motor tics (uncontrolled movements). They may also help you feel calmer, more focused, and less irritable.  •Anticholinergics: This medicine may be given to decrease the side effects of other needed medicines.  •Take your medicine as directed. Contact your healthcare provider if you think your medicine is not helping or if you have side effects. Tell him of her if you are allergic to any medicine. Keep a list of the medicines, vitamins, and herbs you take. Include the amounts, and when and why you take them. Bring the list or the pill bottles to follow-up visits. Carry your medicine list with you in case of an emergency.    Manage your symptoms: The following may help you feel better or prevent symptoms of schizoaffective disorder from coming back:  •Find support for yourself and your family: Talk with others to help you cope with your illness better. This may also help to improve how you relate to others.  •Keep all medical appointments: This will help manage your disease and the side-effects from medicines you may be taking.   •Use your medicines as directed: Put your medicines in a pillbox placed in an area you can easily see. Use a watch with an alarm to help you remember when it is time to take your medicine. Tell your healthcare provider if you know or think you might be pregnant. Do not stop taking your medicines without your healthcare provider's okay. A sudden stop can cause serious medical problems.   •Watch for early signs of a relapse and seek help immediately: ?How you think, feel, and see things has changed.  ?You behave differently than usual.  ?You become more nervous and upset, but do not know why.  ?You eat less and have trouble sleeping.  ?You have little or no interest in friends or activities.    Contact your healthcare provider or psychiatrist if:   •You think you are having a relapse.  •You are having side effects from your medicine, or they are not helping.  •You are not sleeping well or are sleeping more than usual.  •You cannot eat or are eating more than usual.  •You have muscle spasms, stiffness, or trouble walking.  •Your sad feelings or thoughts change the way you function during the day.  •You have questions or concerns about your condition or care.    Return to the emergency department if:   •You feel like hurting or killing yourself or others.  •You feel that your condition is getting worse.  •You feel very upset, threaten someone, or you feel violent.  •You suddenly have changes in your vision.  •You suddenly have chest pain, trouble breathing, or a fever.

## 2022-03-04 NOTE — ED ADULT NURSE NOTE - CHIEF COMPLAINT QUOTE
Patient presents to ED for medication (depakote, cogentin, and fluphenazine). Patient states that nurse at [facility] did not give medication

## 2022-03-04 NOTE — ED PROVIDER NOTE - ATTENDING CONTRIBUTION TO CARE
51-year-old male with schizoaffective disorder sent from [facility] for evaluation after he was yelling at staff.  Patient apparently was upset that he could have cheesecake again got into argument with staff member while receiving meds.  No altercation or aggressive behavior.  Patient, cooperative in ED without complaints.  Requesting his evening meds.  No SI/HI/AVH.  Denies any physical complaints.    VITAL SIGNS: noted  CONSTITUTIONAL: Well-developed; well-nourished; in no acute distress  HEAD: Normocephalic; atraumatic  EYES: PERRL, EOM intact; conjunctiva and sclera clear  ENT: No nasal discharge; airway clear. MMM  NECK: Supple; non tender.   CARD: S1, S2 normal; no murmurs, gallops, or rubs. Regular rate and rhythm  RESP: CTAB/L, no wheezes, rales or rhonchi  ABD: Normal bowel sounds; soft; non-distended; non-tender; no hepatosplenomegaly. No CVA tenderness  EXT: Normal ROM. No calf tenderness or edema. Distal pulses intact  NEURO: Alert, oriented. Grossly unremarkable. No focal deficits  SKIN: Skin exam is warm and dry  PSYCH: calm and cooperative

## 2022-03-04 NOTE — ED ADULT NURSE NOTE - OBJECTIVE STATEMENT
Patient presents to ED for medication (depakote, cogentin, and fluphenazine). Patient states that nurse at [facility] did not give medication

## 2022-03-04 NOTE — ED PROVIDER NOTE - PROGRESS NOTE DETAILS
Patient is calm and cooperative in ED. denies SI/HI and A/V Hallucination. give patient's his medications and dc back to [facility]

## 2022-03-04 NOTE — ED PROVIDER NOTE - NS ED ROS FT
Constitutional: no fever, chills, no recent weight loss, change in appetite or malaise  Cardiac: No chest pain, SOB or edema.  Respiratory: No cough or respiratory distress  GI: No nausea, vomiting, diarrhea or abdominal pain.  Neuro/Psych: See HPI.  Skin: No skin rash.  Endocrine: No history of thyroid disease or diabetes.

## 2022-03-04 NOTE — ED ADULT TRIAGE NOTE - CHIEF COMPLAINT QUOTE
Patient presents to ED for medication (depakote, cogentin, and fluphenazine). Patient states that nurse at [place] did not give medication

## 2022-03-04 NOTE — ED PROVIDER NOTE - OBJECTIVE STATEMENT
50 yo male hx of bipolar/ schizoaffective 50 yo male hx of bipolar/ schizoaffective sent from [facility] 2/2 agitation and aggressive behavior. as per RN over the phone, patient wasn't give his portion of cheesecake and he started acting out. He started throwing his medications and foods to floor and wouldn't cooperate. Started getting more agitated so she called [employer].   patient is now calm and cooperative in ED. requests his night medications and food. denies SI/HI and A/V Hallucinations. denes medical complaints

## 2022-03-04 NOTE — ED PROVIDER NOTE - PATIENT PORTAL LINK FT
You can access the FollowMyHealth Patient Portal offered by [facility] by registering at the following website: [email or web address]. By joining [employer]’s FollowMyHealth portal, you will also be able to view your health information using other applications (apps) compatible with our system.

## 2022-03-04 NOTE — ED PROVIDER NOTE - PHYSICAL EXAMINATION
CONSTITUTIONAL: Well-appearing; well-nourished; in no apparent distress.   CARDIOVASCULAR: Normal S1, S2; no murmurs, rubs, or gallops.   RESPIRATORY: Normal chest excursion with respiration; breath sounds clear and equal bilaterally; no wheezes, rhonchi, or rales.  NEURO/PSYCH: A & O x 4; grossly unremarkable. Calm and Cooperative. Denies SI/HI and A/V Hallucination. speaking coherently and answering appropriately.

## 2022-03-05 RX ADMIN — Medication 50 MILLIGRAM(S): at 01:58

## 2022-03-05 RX ADMIN — Medication 500 MILLIGRAM(S): at 00:00

## 2022-06-16 NOTE — ED BEHAVIORAL HEALTH ASSESSMENT NOTE - REMOTE MEMORY
NOT VISUALLY SIGNIFICANT: Informed patient that their cataract is not visually significant or does not meet the criteria for cataract surgery. Recommend attention to cataract symptoms monitoring by regular examinations. Patient instructed to call with changes in vision. Normal

## 2022-09-29 NOTE — ED PROVIDER NOTE - TOBACCO USE
[FreeTextEntry1] : syncope 1 yr ago - given recurrent recommend cardio eval and if work up negative recommend neuro work up \par follow up labs \par get mammo and see gyn \par Xray of hip and lower back \par get records of cologuard  Current every day smoker

## 2022-10-09 ENCOUNTER — EMERGENCY (EMERGENCY)
Facility: HOSPITAL | Age: 52
LOS: 0 days | Discharge: HOME | End: 2022-10-09

## 2022-10-09 DIAGNOSIS — Z02.9 ENCOUNTER FOR ADMINISTRATIVE EXAMINATIONS, UNSPECIFIED: ICD-10-CM

## 2022-10-09 PROCEDURE — L9981: CPT

## 2022-11-11 NOTE — ED ADULT TRIAGE NOTE - SPO2 (%)
96 Z Plasty Text: In order to reduce appearance and discomfort related to the web, a Z-plasty was designed.  Aftert prep and anesthesia, a horizontal incision was made across the web.  A double transposition flap was incised in a Z-plasty fashion.  The wound margins were widely undermined to elevate two flaps of skin.  After hemostasis was obtained, the flaps were transposed into place, and sutured in a a layered fashion.

## 2023-01-19 NOTE — ED ADULT TRIAGE NOTE - NS ED NOTE AC HIGH RISK COUNTRIES
Pt mom called need a  faxed to his school   For the 17-20 of January   [phone]

## 2023-05-26 NOTE — ED ADULT NURSE NOTE - CAS EDN DISCHARGE ASSESSMENT
Chief Complaint: B/L Lower extremity pain    HPI:  Patient is a 67yo F with PMH significant for T2DM, Afib s/p ablation on Eliquis, fibromyalgia, OA, RENARD on CPAP, depression, chronic venous insufficiency, endometrial cancer s/p hysterectomy 2010, HTN, HLD, hypothyroidism, brought from assisted living for generalized weakness of 3 weeks duration, now with associated worsening of bilateral LE pain for 1 week duration. She reported fever and chills of 2-3 days duration. She also stated she had been having difficulty ambulating for the past few days, when she ordinarily uses a walker to ambulate without concerns.     She lives at the assisted living facility because she “could not manage the house.” She was initially noted by staff in the ED to have slurred speech but stated that was because she had been frazzled by the commotion associated with bringing her to the hospital late at night.     She has been hospitalized at least 3 times in the past for LE cellulitis.    Vital signs significant for: Febrile 38.8C, 100% on 2L NC now 96% on RA  Labs significant for: Hb 11.2, INR 1.63, Na 133, Cr 1.18 (1.23 in 5/2021), UA: mod LE, 13 WBCs, neg bacteria, neg nitrite; f/s 140s-170s  Imaging significant for:   - CT head: no acute intracranial hemorrhage or mass effect  - CXR (personally reviewed) – without consolidation, infiltration, or pulmonary vascular congestion   (22 May 2023 11:52)      PAST MEDICAL & SURGICAL HISTORY:  Personal History of Arthritis      Personal History of Female Genital Cancer      Personal History of Hypertension      Hypothyroidism      Diabetes Mellitus      Hyperlipidemia      High Triglycerides      Osteoarthritis of Knee  right. requires cane      Obstructive Sleep Apnea      Constipation      Diarrhea      Depression      Environmental Allergies      Morbidly Obese      Fibromyalgia      GERD with Apnea      Restless Legs Syndrome (RLS)      Umbilical Hernia      Atrial fibrillation      S/P Tonsillectomy and Adenoidectomy      S/P FESS (Functional Endoscopic Sinus Surgery)      Carpal Tunnel Syndrome  release left      Endometrial Ca s/p RODRIGO, BSO      Incarcerated Ventral Hernia          FAMILY HISTORY:  No pertinent family history in first degree relatives        SOCIAL HISTORY:  [ ] Denies Smoking, Alcohol, or Drug Use    Allergies    smoke (Rhinitis; Rhinorrhea)  adhesives (Rash)  pollen (Rhinitis; Rhinorrhea)  dust (Rhinitis; Rhinorrhea)  Tin/costume jewelry (Rash)  aerosols, perfumes, fabric softeners (Rash; Rhinitis; Rhinorrhea)  Lyrica (Rash)    Intolerances        PAIN MEDICATIONS:  acetaminophen     Tablet .. 650 milliGRAM(s) Oral every 6 hours PRN  buPROPion XL (24-Hour) . 300 milliGRAM(s) Oral daily  escitalopram 20 milliGRAM(s) Oral daily  gabapentin 600 milliGRAM(s) Oral two times a day  HYDROmorphone   Tablet 4 milliGRAM(s) Oral every 6 hours PRN  melatonin 3 milliGRAM(s) Oral at bedtime PRN  ondansetron Injectable 4 milliGRAM(s) IV Push every 8 hours PRN      Heme:  apixaban 5 milliGRAM(s) Oral every 12 hours    Antibiotics:  cefTRIAXone   IVPB 1000 milliGRAM(s) IV Intermittent every 24 hours  vancomycin  IVPB 1250 milliGRAM(s) IV Intermittent every 12 hours    Cardiovascular:  buMETAnide 0.5 milliGRAM(s) Oral daily  spironolactone 25 milliGRAM(s) Oral daily    GI:  aluminum hydroxide/magnesium hydroxide/simethicone Suspension 30 milliLiter(s) Oral every 4 hours PRN  pantoprazole    Tablet 40 milliGRAM(s) Oral before breakfast  polyethylene glycol 3350 17 Gram(s) Oral daily  senna 2 Tablet(s) Oral at bedtime    Endocrine:  atorvastatin 40 milliGRAM(s) Oral at bedtime  dextrose 50% Injectable 25 Gram(s) IV Push once  dextrose 50% Injectable 12.5 Gram(s) IV Push once  dextrose 50% Injectable 25 Gram(s) IV Push once  dextrose Oral Gel 15 Gram(s) Oral once PRN  glucagon  Injectable 1 milliGRAM(s) IntraMuscular once  insulin glargine Injectable (LANTUS) 24 Unit(s) SubCutaneous at bedtime  insulin lispro (ADMELOG) corrective regimen sliding scale   SubCutaneous three times a day before meals  insulin lispro (ADMELOG) corrective regimen sliding scale   SubCutaneous at bedtime  insulin lispro Injectable (ADMELOG) 8 Unit(s) SubCutaneous three times a day before meals  levothyroxine 125 MICROGram(s) Oral daily    All Other Medications:  dextrose 5%. 1000 milliLiter(s) IV Continuous <Continuous>  dextrose 5%. 1000 milliLiter(s) IV Continuous <Continuous>          Vital Signs Last 24 Hrs  T(C): 36.3 (26 May 2023 12:43), Max: 36.5 (25 May 2023 23:07)  T(F): 97.4 (26 May 2023 12:43), Max: 97.7 (25 May 2023 23:07)  HR: 58 (26 May 2023 12:43) (52 - 60)  BP: 127/52 (26 May 2023 12:43) (108/52 - 127/52)  BP(mean): --  RR: 18 (26 May 2023 12:43) (17 - 18)  SpO2: 97% (26 May 2023 12:43) (96% - 98%)    Parameters below as of 26 May 2023 12:43  Patient On (Oxygen Delivery Method): room air        PAIN SCORE:   6/10      SCALE USED: (1-10 VNRS)             PHYSICAL EXAM:    GENERAL: NAD, well-groomed, well-developed    EXTREMITIES:  Erythematous and swollen bilateral lower extremities.        LABS:                          11.2   6.70  )-----------( 334      ( 26 May 2023 05:54 )             36.7     05-26    140  |  102  |  17  ----------------------------<  144<H>  4.2   |  23  |  1.10    Ca    9.3      26 May 2023 05:54  Phos  4.1     05-26  Mg     1.90     05-26      Patient seen at bedside, complaining of severe right knee pain radiating to her toes. Pain is constant and feels like a “tooth ache” per patient. Patient reports numbness on both legs. Patient states she can walk to the bathroom with assistance. Patient sees Dr. Hi outpatient for her chronic pain from arthritis and fibromyalgia and takes PO Dilaudid 2mg four times a day. Patient states most of the days she will only need to 2 to 3 pills. Patient also takes Gabapentin 600mg TID at home. Reports that the PO Dilaudid 4mg helps with the pain but only last for 4 hours. Patient denies alcohol use, smoking and use of illicit drugs. Patient alert and orientedx4. Patient answers questions appropriately. Maintains eye contact. Not in any apparent distress.  RECOMMENDATIONS:  1) Recommend discontinuing current orders for PO Acetaminophen instead order:  PO Acetaminophen 650mg Q6H standing x2 days, then PRN for pain. Not to be given within 6 hours of last dose of Acetaminophen.  2) Recommend discontinuing current orders for Dilaudid instead order:  PO Dilaudid 4mg Q4H PRN for moderate to severe pain. Hold for over sedation. Not to be given within one hour of any other immediate acting opioid.  3) Recommend Holistic RN consult.  4) Recommend Lidocaine patch to right knee. 12 hours ON/12 hours OFF X 5 days.  5) Recommend outpatient follow up with chronic pain management upon discharge.   I STOP Reviewed and found:  Hydromorphone 4mg tablets, quantity of 20 pills for 5 days. Last dispensed on 05/20/23.  Discussed patient with Dr. Jerome who agrees with the above plan. Pain service to sign off. Please call chronic pain service if further assistance needed with pain management.       Alert and oriented to person, place and time

## 2023-08-04 NOTE — ED ADULT TRIAGE NOTE - SOURCE OF INFORMATION
Pt is alert and oriented x4. Plan of care reviewed and pt verbalized understanding. Pt denies pain at this time, no signs of distress or labored breathing noted. Safety precautions followed, call light and belongings within reach.    Patient

## 2023-10-25 NOTE — ED ADULT NURSE NOTE - CHIEF COMPLAINT
The patient is a 49y Male complaining of toothache. Recommendation Preamble: The following recommendations were made during the visit: Recommendations (Free Text): Lion, Detail Level: Zone Render Risk Assessment In Note?: no

## 2023-11-11 NOTE — ED ADULT NURSE NOTE - EENT WDL
Madhavi Flores, PAAMANDA  11/10/23 1958     Eyes with no visual disturbances.  Ears clean and dry and no hearing difficulties. Nose with pink mucosa and no drainage.  Mouth mucous membranes moist and pink.  No tenderness or swelling to throat or neck.

## 2023-12-09 NOTE — ED PROVIDER NOTE - CONSTITUTIONAL NEGATIVE STATEMENT, MLM
[facility] Internal Medicine   Progress Note              Subjective:  Patient seen this am.  Pt upset that she was npo for possible [facility] thrombectomy today. Denies sob, cp,  has left leg swelling      I/O's    Intake/Output Summary (Last 24 hours) at 12/9/2023 0949  Last data filed at 12/9/2023 0422  Gross per 24 hour   Intake 325 ml   Output --   Net 325 ml           ALLERGIES:  Patient has no known allergies.      Hospital Meds  Current Facility-Administered Medications   Medication Dose Route Frequency Provider Last Rate Last Admin    sodium chloride 0.9% infusion   Intravenous Continuous PRN Ata Choi MD        donepezil (ARICEPT) tablet 5 mg  5 mg Oral Nightly Deena Ny MD        ferrous sulfate (65 mg Fe per 325 mg) tablet 325 mg  325 mg Oral TID WC Deena Ny MD        folic acid (FOLATE) tablet 1 mg  1 mg Oral Daily Deena Ny MD        methIMAzole (TAPAZOLE) tablet 5 mg  5 mg Oral Once per day on Mon Tue Wed Thu Fri Sat Dwayne Ruvalcaba MD        mirtazapine (REMERON) tablet 7.5 mg  7.5 mg Oral Nightly Deena Ny MD        sertraline (ZOLOFT) tablet 25 mg  25 mg Oral Daily Deena Ny MD        dextrose 50 % injection 25 g  25 g Intravenous PRN Deena Ny MD        dextrose 50 % injection 12.5 g  12.5 g Intravenous PRN Deena Ny MD        glucagon (GLUCAGEN) injection 1 mg  1 mg Intramuscular PRN Deena Ny MD        dextrose (GLUTOSE) 40 % gel 15 g  15 g Oral PRN Deena Ny MD        dextrose (GLUTOSE) 40 % gel 30 g  30 g Oral PRN Deena Ny MD        insulin lispro (ADMELOG,HumaLOG) - Correction Dose   Subcutaneous TID WC Deena Ny MD        insulin lispro (ADMELOG,HumaLOG) - Correction Dose   Subcutaneous Nightly Deena Ny MD        heparin (porcine) 25,000 units/250 mL in dextrose 5 % infusion  1-40 Units/kg/hr (Order-Specific) Intravenous Continuous Marleen Owens MD 9.3 mL/hr at 12/09/23 0535 15 Units/kg/hr at 12/09/23 0535    heparin (porcine) "injection 4,900 Units  80 Units/kg (Order-Specific) Intravenous PRN Ezequiel Melissa MD        heparin (porcine) injection 2,500 Units  40 Units/kg (Order-Specific) Intravenous PRN Ezequiel Melissa MD        sodium chloride 0.9% infusion   Intravenous Continuous PRN Ezequiel Melissa MD        sodium chloride 0.9 % flush bag 25 mL  25 mL Intravenous PRN Vinicio Ny MD        sodium chloride 0.9 % injection 2 mL  2 mL Intracatheter 2 times per day Allyson Villar MD        sodium chloride (NORMAL SALINE) 0.9 % bolus 500 mL  500 mL Intravenous PRN Vinicio Ny MD        acetaminophen (TYLENOL) tablet 650 mg  650 mg Oral Q4H PRN Vinicio Ny MD        Or    acetaminophen (TYLENOL) suppository 650 mg  650 mg Rectal Q4H PRN Vinicio Ny MD        ondansetron (ZOFRAN ODT) disintegrating tablet 4 mg  4 mg Oral Q12H PRN Vinicio Ny MD        Or    ondansetron (ZOFRAN) injection 4 mg  4 mg Intravenous Q12H PRN Vinicio Ny MD        polyethylene glycol (MIRALAX) packet 17 g  17 g Oral Daily PRN Vinicio Ny MD        melatonin tablet 3 mg  3 mg Oral Nightly PRN Vinicio Ny MD        sodium chloride 0.9% infusion   Intravenous Continuous PRN Chandrakant Borrego MD            Last Recorded Vitals      SpO2 Readings from Last 3 Encounters:   12/09/23 99%   12/08/23 100%   12/01/23 93%        VITAL SIGNS:     Vital Last Value 24 Hour Range   Temperature 98.2 Â°F (36.8 Â°C) (12/09/23 0750) Temp  Min: 97.1 Â°F (36.2 Â°C)  Max: 98.2 Â°F (36.8 Â°C)   Pulse 87 (12/09/23 0750) Pulse  Min: 80  Max: 106   Respiratory 18 (12/09/23 0750) Resp  Min: 17  Max: 19   Non-Invasive  Blood Pressure 139/62 (12/09/23 0750) BP  Min: 118/61  Max: 139/62   Pulse Oximetry 99 % (12/09/23 0750) SpO2  Min: 96 %  Max: 99 %     Vital Today Admitted   Weight 61.7 kg (136 lb 0.4 oz) (12/09/23 0800) Weight: 61.7 kg (136 lb 0.4 oz) (12/08/23 1825)   Height N/A Height: 5' 7"" (170.2 cm) (12/09/23 0800)   BMI N/A BMI (Calculated): 21.3 (12/09/23 0800) " Physical Exam:  General: awake, no acute distress, on room air  Eyes:  no conjunctival erythema   Cardio: S1, S2, RRR, no ggallop or thrills noted. Pulm: Lungs clear to auscultation bilaterally, no wheeze or rhonchi noted. GI: Soft, non-tender, nondistended. + bs  : No suprapubic Tenderness, no CVA tenderness bilaterally  Ext: left lower leg swollen and bigger than right leg  Skin: No jaundice. Psych:coopeative although argumentative at time  Neuro: move arms and legs to command. Pt appropriately follows commands.     Labs     Recent Labs     12/08/23  1728 12/09/23  0127 12/09/23  0618   WBC 10.6 8.6 7.1   RBC 2.85* 2.77* 2.84*   HGB 6.9* 6.7* 7.1*   HCT 21.2* 20.5* 21.9*    201 160   MCV 74.4* 74.0* 77.1*   MCH 24.2* 24.2* 25.0*   MCHC 32.5 32.7 32.4   NRBCRE 0 0 0         Recent Labs     12/08/23  1115 12/08/23  1728 12/09/23  0618   SODIUM  --  131* 136   POTASSIUM  --  4.8 4.0   CO2  --  20* 20*   ANIONGAP  --  11 11   GLUCOSE  --  158* 118*   BUN  --  29* 22*   CREATININE  --  1.39* 1.05*   CALCIUM  --  8.2* 8.3*   BILIRUBIN  --  0.7  --    AST  --  24  --    GPT  --  24  --    ALKPT  --  109  --    GLOB  --  5.1*  --    AGR  --  0.5*  --    *  --   --         Recent Labs   Lab 12/08/23 1728   NTPROB 527*        Recent Labs     12/08/23  1728 12/09/23  0127   INR 1.1  --    PT 12.0*  --    PTT 29 78*       Recent Labs   Lab 12/09/23  0804   GLUCOSE BEDSIDE 106*       Imaging    IR MECHANICAL THROMBECTOMY VEIN(S) WITH THROMBOLYTIC    (Results Pending)       Cultures  Microbiology Results       None               Assessment/Plan:  Active Hospital Problems    Diagnosis     RUFINO (acute kidney injury) (CMD)     Acute deep vein thrombosis (DVT) of femoral vein of left lower extremity (CMD)     History of breast cancer     Sickle cell trait (CMD)     Subclinical hyperthyroidism     Iron deficiency anemia, unspecified     Thalassemia       Patient is an 80-year-old female with history of stage II colon cancer, sickle cell trait, breast cancer (2014), melanoma, iron deficiency anemia, thalassemia, diabetes mellitus type 2, depression, and dementia who presented to the ER with left leg swelling. Acute extensive left lower extremity DVT  -US: acute occlusive thrombosis of the entire left lower extremity. CT of the chest, abdomen, and pelvis: Findings suspicious for acute thrombus in the left common femoral and femoral veins.  -continue with IV heparin   seen by vascular surg, rec IR for mech thrombectomy   Ps dr Asha Vazquez this am, rec to cont iv heparin and IR will eval pt for mech thrombectomy on Monday  Pt and son mr tellez aware    Anemia fe deficient  History of sickle cell trait and thalassemia  -patient  transfused with 1 unit pRBC overnight for hgb 6.9, improves to 7.1 to 8.5  Per son, pt has recent transfusion.   Epic chart noted pt here for transfusion for hgb 6.1 on 12/1/23  -trend hgb, check fe panel  Check occult blood     Stage II colon cancer  History breast cancer  Melanoma  -oncology on consult dr Tess Ritchie     diabetes mellitus type 2  -monitor fingerstick blood glucose  -start on low dose sliding scale insulin     RUFINO  -likely due to hypovolemia  -cr 1.39 at admit, improves to 1 today  Baseline cr 1.1 on 11/30/23     Hypertension  -stable  -hold amlodipine and lisinopril at admit to avoid hypotension    Bp 130-140's today   Resume norvasc but lower dose at 2.5 mg today     Depression  -continue Sertraline and Mirtazapine     Hyperthyroidism  -continue methimazole     dementia  -continue donepezil      Code Status: Full Resuscitation    Physician Notification: ps IR dr Hernandez Quintana  Communication: with patient, nurse and son mr tellez by phone    Time SPENT ON [address], This includes the following: Review of all vitals, medications, new orders, I's/O's, laboratory values, microbiology data, imaging, nursing notes, consultant notes which are reflected in assessment and plan.This does not include time spent on other items of care such as smoking cessation counseling, and/or advanced care planning if applicable.      Primary Care Physician  Rachel Tinoco MD [facility]  12/9/2023 9:49 AM no fever and no chills.

## 2024-04-19 ENCOUNTER — EMERGENCY (EMERGENCY)
Facility: HOSPITAL | Age: 54
LOS: 0 days | Discharge: HOME | End: 2024-04-19
Attending: STUDENT IN AN ORGANIZED HEALTH CARE EDUCATION/TRAINING PROGRAM
Payer: COMMERCIAL

## 2024-04-19 VITALS
RESPIRATION RATE: 18 BRPM | HEART RATE: 78 BPM | SYSTOLIC BLOOD PRESSURE: 118 MMHG | DIASTOLIC BLOOD PRESSURE: 67 MMHG | TEMPERATURE: 97 F | OXYGEN SATURATION: 99 %

## 2024-04-19 DIAGNOSIS — Z01.89 ENCOUNTER FOR OTHER SPECIFIED SPECIAL EXAMINATIONS: ICD-10-CM

## 2024-04-19 PROCEDURE — 99283 EMERGENCY DEPT VISIT LOW MDM: CPT | Mod: 1L

## 2024-04-19 PROCEDURE — 99282 EMERGENCY DEPT VISIT SF MDM: CPT

## 2024-04-19 NOTE — ED PROVIDER NOTE - PATIENT PORTAL LINK FT
You can access the FollowMyHealth Patient Portal offered by [facility] by registering at the following website: [email or web address]. By joining [employer]’s FollowMyHealth portal, you will also be able to view your health information using other applications (apps) compatible with our system.

## 2024-04-19 NOTE — ED PROVIDER NOTE - CLINICAL SUMMARY MEDICAL DECISION MAKING FREE TEXT BOX
54-year-old male presenting today for evaluation of right thumb.  Patient requesting gauze and surgical tape.  Denies trauma.  States that there may be a bruise there however denies finger pain.  On exam no evidence of trauma, nontender, no open wounds.  No signs of infection.  Patient has full range of motion of the right thumb.  Can flex at the PIP joint.  Patient given tape and gauze however no evidence of abnormality to the thumb.

## 2024-04-19 NOTE — ED PROVIDER NOTE - PHYSICAL EXAMINATION
CONSTITUTIONAL: Well-developed; well-nourished; in no acute distress.   SKIN: warm, dry  HEAD: Normocephalic; atraumatic.  ENT: No nasal discharge; airway clear.  NECK: Supple; non tender.  EXT: Normal ROM.  normal fingers. poor hygiene.   NEURO: Alert, oriented, grossly unremarkable.   PSYCH: Cooperative, appropriate.

## 2024-04-19 NOTE — ED ADULT NURSE NOTE - NSFALLUNIVINTERV_ED_ALL_ED
Bed/Stretcher in lowest position, wheels locked, appropriate side rails in place/Call bell, personal items and telephone in reach/Instruct patient to call for assistance before getting out of bed/chair/stretcher/Non-slip footwear applied when patient is off stretcher/[place] to call system/Physically safe environment - no spills, clutter or unnecessary equipment/Purposeful proactive rounding/Room/bathroom lighting operational, light cord in reach

## 2024-06-23 ENCOUNTER — EMERGENCY (EMERGENCY)
Facility: HOSPITAL | Age: 54
LOS: 0 days | Discharge: HOME | End: 2024-06-23
Attending: INTERNAL MEDICINE
Payer: MEDICAID

## 2024-06-23 VITALS
RESPIRATION RATE: 20 BRPM | HEART RATE: 87 BPM | TEMPERATURE: 98 F | SYSTOLIC BLOOD PRESSURE: 145 MMHG | DIASTOLIC BLOOD PRESSURE: 67 MMHG | OXYGEN SATURATION: 99 %

## 2024-06-23 PROCEDURE — 99283 EMERGENCY DEPT VISIT LOW MDM: CPT

## 2024-06-23 PROCEDURE — 99285 EMERGENCY DEPT VISIT HI MDM: CPT
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
## 2024-06-23 NOTE — ED PROVIDER NOTE - PATIENT PORTAL LINK FT
You can access the FollowMyHealth Patient Portal offered by [facility] by registering at the following website: [email or web address]. By joining [facility]’s FollowMyHealth portal, you will also be able to view your health information using other applications (apps) compatible with our system.

## 2024-06-23 NOTE — ED ADULT NURSE NOTE - NSFALLUNIVINTERV_ED_ALL_ED
Bed/Stretcher in lowest position, wheels locked, appropriate side rails in place/Call bell, personal items and telephone in reach/Instruct patient to call for assistance before getting out of bed/chair/stretcher/Non-slip footwear applied when patient is off stretcher/[place] to call system/Physically safe environment - no spills, clutter or unnecessary equipment/Purposeful proactive rounding/Room/bathroom lighting operational, light cord in reach
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
## 2024-06-23 NOTE — ED PROVIDER NOTE - PHYSICAL EXAMINATION
CONSTITUTIONAL: well-appearing, in NAD  SKIN: Warm dry, normal skin turgor  HEAD: NCAT  EYES: EOMI, PERRLA, no scleral icterus, conjunctiva pink  ENT: normal pharynx with no erythema or exudates  NECK: Supple; non tender. Full ROM.  CARD: RRR  RESP: clear to ausculation b/l. No crackles or wheezing.  ABD: soft, non-tender, non-distended, no rebound or guarding.  EXT: Full ROM, no bony tenderness, no pedal edema, no calf tenderness  NEURO: normal motor. normal sensory. Normal gait.  PSYCH: Cooperative, appropriate.
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
## 2024-06-24 DIAGNOSIS — R45.6 VIOLENT BEHAVIOR: ICD-10-CM

## 2024-06-24 DIAGNOSIS — F25.0 SCHIZOAFFECTIVE DISORDER, BIPOLAR TYPE: ICD-10-CM
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
## 2024-08-16 NOTE — ED ADULT TRIAGE NOTE - NS ED NURSE BANDS TYPE
Problem: Adult Inpatient Plan of Care  Goal: Plan of Care Review  Outcome: Progressing  Flowsheets (Taken 8/16/2024 1405)  Plan of Care Reviewed With: patient  Goal: Patient-Specific Goal (Individualized)  Outcome: Progressing  Flowsheets (Taken 8/16/2024 1405)  Individualized Care Needs: recliner, conversation, personal book  Anxieties, Fears or Concerns: sore back  Patient/Family-Specific Goals (Include Timeframe): no s/s of reaction with iron infusion     Problem: Fatigue  Goal: Improved Activity Tolerance  Outcome: Progressing  Intervention: Promote Improved Energy  Flowsheets (Taken 8/16/2024 1405)  Activity Management: Up in chair - L3   Pt tolerated Ferrlecit infusion well.  No adverse reaction noted.   PAD flushed with NS and Heparin and de-accessed per protocol.  Patient left clinic in no acute distress using walker.    
Name band;

## 2024-12-10 NOTE — ED ADULT TRIAGE NOTE - SOURCE OF INFORMATION
Patient Number Of Freeze-Thaw Cycles: 1 freeze-thaw cycle Render Note In Bullet Format When Appropriate: No Show Aperture Variable?: Yes Detail Level: Detailed Post-Care Instructions: I reviewed with the patient in detail post-care instructions. Patient is to wear sunprotection, and avoid picking at any of the treated lesions. Pt may apply Vaseline to crusted or scabbing areas. Consent: The patient's consent was obtained including but not limited to risks of crusting, scabbing, blistering, scarring, darker or lighter pigmentary change, recurrence, incomplete removal and infection. Duration Of Freeze Thaw-Cycle (Seconds): 3

## 2025-01-22 NOTE — ED PROVIDER NOTE - NS_EDPROVIDERDISPOUSERTYPE_ED_A_ED
ENDOSCOPY DISCHARGE INSTRUCTIONS    Procedure Performed:   Colonoscopy and Polypectomy  Endoscopist: No name on file  FINDINGS:   Colon polyp(s) (a growth in the colon)    MEDICATIONS:  You may resume all other medications today    DIET:  General    BIOPSIES:  Biopsies were taken (you will be notified of results in 7-10 days)      ADDITIONAL RECOMMENDATIONS:  Recommendations regarding repeat colonoscopy will be made once the biopsy results are reviewed.    May resume xarelto today.    Activity for remainder of today:    REST TODAY  DO NOT drive or operate heavy machinery  DO NOT drink any alcoholic beverages  DO NOT sign any legal documents or make any important decisions    After your procedure(s):  It is not unusual to feel bloated or gassy .  Passing gas and belching is encouraged. Lying on your left side with your knees flexed may relieve the discomfort. A hot pack to the abdomen may also help.    After your gastroscopy (upper endoscopy): You may experience a slight sore throat which will subside. Throat lozenges or salt water gargle can be used.    FOLLOW-UP:  Contact the office at [phone] for follow-up appointment if needed or if you develop any of the following:    Severe abdominal pain/discomfort       Excessive bleeding or black tarry stool  Difficulty breathing or swallowing        Persistent nausea,vomiting, or a fever above 100 degrees or chills    Attending Attestation (For Attendings USE Only)...

## 2025-01-23 NOTE — ED ADULT TRIAGE NOTE - BSA (M2)
Discussed care with family in person after patient's HD on 1/22.   Family wanted clarification on prednisone dosing.   Patient not taking midodrine pre HD per wife and taking 2.5 mg, not 5mg TID. BP reasonable and no reports of low BP with home RN per wife.   Okay to continue reduced dose and continue to follow for titration of medication as appropriate.    2.07

## 2025-06-11 ENCOUNTER — EMERGENCY (EMERGENCY)
Facility: HOSPITAL | Age: 55
LOS: 1 days | Discharge: HOME | End: 2025-06-13
Attending: EMERGENCY MEDICINE
Payer: MEDICAID

## 2025-06-11 VITALS
HEART RATE: 67 BPM | WEIGHT: 175.05 LBS | SYSTOLIC BLOOD PRESSURE: 141 MMHG | RESPIRATION RATE: 18 BRPM | DIASTOLIC BLOOD PRESSURE: 96 MMHG | TEMPERATURE: 99 F | OXYGEN SATURATION: 99 %

## 2025-06-11 PROCEDURE — 99283 EMERGENCY DEPT VISIT LOW MDM: CPT

## 2025-06-11 PROCEDURE — 99281 EMR DPT VST MAYX REQ PHY/QHP: CPT

## 2025-06-11 NOTE — ED PROVIDER NOTE - OBJECTIVE STATEMENT
56 yo m sts he missed his valproic acid dose last night and is requesting  a dose now. he says his next dose is belgica few hours this am. he has no medical complaints.

## 2025-06-11 NOTE — ED ADULT TRIAGE NOTE - CHIEF COMPLAINT QUOTE
BIBA from [facility] [place] requesting his meds which he isn't due for until the morning.

## 2025-06-11 NOTE — ED ADULT NURSE NOTE - CHIEF COMPLAINT QUOTE
BIBA from [facility] requesting his meds which he isn't due for until the morning.

## 2025-06-11 NOTE — ED PROVIDER NOTE - PATIENT PORTAL LINK FT
You can access the FollowMyHealth Patient Portal offered by [facility] by registering at the following website: [email or web address]. By joining [employer]’s FollowMyHealth portal, you will also be able to view your health information using other applications (apps) compatible with our system.

## 2025-06-11 NOTE — ED PROVIDER NOTE - CLINICAL SUMMARY MEDICAL DECISION MAKING FREE TEXT BOX
pt requesting his med dose, but then changed his mind and said he would wait for his morning meds at his facility.
